# Patient Record
Sex: FEMALE | Race: WHITE | NOT HISPANIC OR LATINO | Employment: OTHER | URBAN - METROPOLITAN AREA
[De-identification: names, ages, dates, MRNs, and addresses within clinical notes are randomized per-mention and may not be internally consistent; named-entity substitution may affect disease eponyms.]

---

## 2017-02-15 ENCOUNTER — OFFICE VISIT (OUTPATIENT)
Dept: FAMILY MEDICINE CLINIC | Facility: CLINIC | Age: 67
End: 2017-02-15

## 2017-02-15 VITALS
SYSTOLIC BLOOD PRESSURE: 108 MMHG | WEIGHT: 181 LBS | HEIGHT: 64 IN | RESPIRATION RATE: 16 BRPM | TEMPERATURE: 98.1 F | BODY MASS INDEX: 30.9 KG/M2 | HEART RATE: 96 BPM | DIASTOLIC BLOOD PRESSURE: 72 MMHG | OXYGEN SATURATION: 98 %

## 2017-02-15 DIAGNOSIS — E78.1 HYPERTRIGLYCERIDEMIA: ICD-10-CM

## 2017-02-15 DIAGNOSIS — L40.9 PSORIASIS: Primary | ICD-10-CM

## 2017-02-15 PROCEDURE — 99213 OFFICE O/P EST LOW 20 MIN: CPT | Performed by: NURSE PRACTITIONER

## 2017-02-15 NOTE — PROGRESS NOTES
Subjective   Arcelia Cole is a 66 y.o. female.     History of Present Illness   Arcelia Cole 66 y.o. female who presents today for routine follow up check and medication refills.  She was a previous patient of Dr. Arvizu but is in need of new PCP as he has left the practice.  she has a history of   Patient Active Problem List   Diagnosis   • Psoriasis   • Hypertriglyceridemia   .  Since the last visit, she has overall felt well.  she has been compliant with current meds.  she denies medication side effects. She reports feeling well since last visit with no issues.  Patient states she uses ultravate cream as needed for psoriasis and that it has worked well for her for years. She has had the same tube for a few years.  She reports history of elevated triglycerides but has not had lab work checked in some time.     The following portions of the patient's history were reviewed and updated as appropriate: allergies, current medications, past family history, past medical history, past social history, past surgical history and problem list.    Review of Systems   Constitutional: Negative for unexpected weight change.   Respiratory: Negative for shortness of breath.    Cardiovascular: Negative for chest pain and palpitations.   Psychiatric/Behavioral: Negative for behavioral problems.       Objective   Physical Exam   Constitutional: She is oriented to person, place, and time. She appears well-developed and well-nourished.   Neck: Carotid bruit is not present. No thyromegaly present.   Cardiovascular: Normal rate and regular rhythm.    Pulmonary/Chest: Effort normal and breath sounds normal.   Neurological: She is alert and oriented to person, place, and time.   Psychiatric: She has a normal mood and affect. Judgment normal.   Vitals reviewed.      Assessment/Plan   Arcelia was seen today for psoriasis.    Diagnoses and all orders for this visit:    Psoriasis  -     halobetasol (ULTRAVATE) 0.05 % cream; Apply   topically 2 (Two) Times a Day.    Hypertriglyceridemia  -     Comprehensive metabolic panel  -     Lipid panel  -     CBC and Differential          Patient given order for labs. She will f/u in one year or sooner if abnormal labs.

## 2019-11-06 ENCOUNTER — OFFICE VISIT (OUTPATIENT)
Dept: FAMILY MEDICINE CLINIC | Facility: CLINIC | Age: 69
End: 2019-11-06

## 2019-11-06 VITALS
BODY MASS INDEX: 31.41 KG/M2 | SYSTOLIC BLOOD PRESSURE: 110 MMHG | WEIGHT: 184 LBS | HEART RATE: 104 BPM | HEIGHT: 64 IN | RESPIRATION RATE: 16 BRPM | OXYGEN SATURATION: 96 % | DIASTOLIC BLOOD PRESSURE: 72 MMHG | TEMPERATURE: 98.4 F

## 2019-11-06 DIAGNOSIS — Z12.11 SCREEN FOR COLON CANCER: ICD-10-CM

## 2019-11-06 DIAGNOSIS — Z78.0 POST-MENOPAUSAL: Primary | ICD-10-CM

## 2019-11-06 DIAGNOSIS — M21.611 BUNION OF RIGHT FOOT: ICD-10-CM

## 2019-11-06 DIAGNOSIS — E78.1 HYPERTRIGLYCERIDEMIA: ICD-10-CM

## 2019-11-06 DIAGNOSIS — Z12.31 ENCOUNTER FOR SCREENING MAMMOGRAM FOR BREAST CANCER: ICD-10-CM

## 2019-11-06 DIAGNOSIS — L40.9 PSORIASIS: ICD-10-CM

## 2019-11-06 DIAGNOSIS — Z86.32 HISTORY OF GESTATIONAL DIABETES: ICD-10-CM

## 2019-11-06 DIAGNOSIS — R10.2 PELVIC PAIN: ICD-10-CM

## 2019-11-06 DIAGNOSIS — E55.9 VITAMIN D DEFICIENCY: ICD-10-CM

## 2019-11-06 DIAGNOSIS — Z23 ENCOUNTER FOR IMMUNIZATION: ICD-10-CM

## 2019-11-06 PROCEDURE — G0009 ADMIN PNEUMOCOCCAL VACCINE: HCPCS | Performed by: PHYSICIAN ASSISTANT

## 2019-11-06 PROCEDURE — G0008 ADMIN INFLUENZA VIRUS VAC: HCPCS | Performed by: PHYSICIAN ASSISTANT

## 2019-11-06 PROCEDURE — 99214 OFFICE O/P EST MOD 30 MIN: CPT | Performed by: PHYSICIAN ASSISTANT

## 2019-11-06 PROCEDURE — 90653 IIV ADJUVANT VACCINE IM: CPT | Performed by: PHYSICIAN ASSISTANT

## 2019-11-06 PROCEDURE — 90732 PPSV23 VACC 2 YRS+ SUBQ/IM: CPT | Performed by: PHYSICIAN ASSISTANT

## 2019-11-06 NOTE — PROGRESS NOTES
"Subjective   Arcelia Cole is a 68 y.o. female.     History of Present Illness    Since the last visit, she has overall felt well.  She has hypertriglycemia and get labs.  she has been compliant with current medications have reviewed them.  The patient denies medication side effects.  Will refill medications. /72 (BP Location: Left arm, Patient Position: Sitting, Cuff Size: Adult)   Pulse 104   Temp 98.4 °F (36.9 °C) (Oral)   Resp 16   Ht 162.6 cm (64\")   Wt 83.5 kg (184 lb)   SpO2 96%   BMI 31.58 kg/m²   Right hallux deformity; hurts---has balance problem from it; refer ortho  No results found for this or any previous visit.  Want her to see GYN----rare pain--like in cervix  Needs colonoscopy  Needs mammo and DEXA  Need labs  Exercise daily  Will refill topical crm  The following portions of the patient's history were reviewed and updated as appropriate: allergies, current medications, past family history, past medical history, past social history, past surgical history and problem list.    Review of Systems   Constitutional: Negative for activity change, appetite change and unexpected weight change.   HENT: Negative for nosebleeds and trouble swallowing.    Eyes: Negative for pain and visual disturbance.   Respiratory: Negative for chest tightness, shortness of breath and wheezing.    Cardiovascular: Negative for chest pain and palpitations.   Gastrointestinal: Negative for abdominal pain and blood in stool.   Endocrine: Negative.    Genitourinary: Positive for enuresis and urgency. Negative for difficulty urinating and hematuria.   Musculoskeletal: Positive for arthralgias and myalgias. Negative for joint swelling.   Skin: Positive for rash. Negative for color change.   Allergic/Immunologic: Negative.    Neurological: Negative for syncope and speech difficulty.   Hematological: Negative for adenopathy.   Psychiatric/Behavioral: Negative for agitation and confusion.   All other systems reviewed and " are negative.      Objective   Physical Exam   Constitutional: She is oriented to person, place, and time. She appears well-developed and well-nourished. No distress.   HENT:   Head: Normocephalic and atraumatic.   Eyes: Conjunctivae and EOM are normal. Pupils are equal, round, and reactive to light. Right eye exhibits no discharge. Left eye exhibits no discharge. No scleral icterus.   Neck: Normal range of motion. Neck supple. No tracheal deviation present. No thyromegaly present.   Cardiovascular: Normal rate, regular rhythm, normal heart sounds, intact distal pulses and normal pulses. Exam reveals no gallop.   No murmur heard.  Pulmonary/Chest: Effort normal and breath sounds normal. No respiratory distress. She has no wheezes. She has no rales.   Musculoskeletal: Normal range of motion.   Neurological: She is alert and oriented to person, place, and time. She exhibits normal muscle tone. Coordination normal.   Skin: Skin is warm. Rash noted. No erythema. No pallor.   Psychiatric: She has a normal mood and affect. Her behavior is normal. Judgment and thought content normal.   Nursing note and vitals reviewed.      Assessment/Plan   Arcelia was seen today for shoulder pain.    Diagnoses and all orders for this visit:    Post-menopausal  -     DEXA Bone Density Axial; Future  -     Mammo Screening Digital Tomosynthesis Bilateral With CAD; Future  -     Comprehensive metabolic panel  -     Lipid panel  -     CBC and Differential  -     TSH  -     T3, Free  -     T4, Free  -     Vitamin B12  -     Folate  -     Vitamin D 25 Hydroxy  -     Hemoglobin A1c  -     Ambulatory Referral to Obstetrics / Gynecology    Encounter for screening mammogram for breast cancer  -     DEXA Bone Density Axial; Future  -     Mammo Screening Digital Tomosynthesis Bilateral With CAD; Future  -     Comprehensive metabolic panel  -     Lipid panel  -     CBC and Differential  -     TSH  -     T3, Free  -     T4, Free  -     Vitamin B12  -      Folate  -     Vitamin D 25 Hydroxy  -     Hemoglobin A1c  -     Ambulatory Referral to Obstetrics / Gynecology    Hypertriglyceridemia  -     Comprehensive metabolic panel  -     Lipid panel  -     CBC and Differential  -     TSH  -     T3, Free  -     T4, Free  -     Vitamin B12  -     Folate  -     Vitamin D 25 Hydroxy  -     Hemoglobin A1c  -     Ambulatory Referral to Obstetrics / Gynecology    Psoriasis  -     halobetasol (ULTRAVATE) 0.05 % cream; Apply  topically to the appropriate area as directed 2 (Two) Times a Day. PRN and never on face  -     Comprehensive metabolic panel  -     Lipid panel  -     CBC and Differential  -     TSH  -     T3, Free  -     T4, Free  -     Vitamin B12  -     Folate  -     Vitamin D 25 Hydroxy  -     Hemoglobin A1c  -     Ambulatory Referral to Obstetrics / Gynecology    Pelvic pain  -     Comprehensive metabolic panel  -     Lipid panel  -     CBC and Differential  -     TSH  -     T3, Free  -     T4, Free  -     Vitamin B12  -     Folate  -     Vitamin D 25 Hydroxy  -     Hemoglobin A1c  -     Ambulatory Referral to Obstetrics / Gynecology    History of gestational diabetes  -     Comprehensive metabolic panel  -     Lipid panel  -     CBC and Differential  -     TSH  -     T3, Free  -     T4, Free  -     Vitamin B12  -     Folate  -     Vitamin D 25 Hydroxy  -     Hemoglobin A1c  -     Ambulatory Referral to Obstetrics / Gynecology    Vitamin D deficiency  -     Comprehensive metabolic panel  -     Lipid panel  -     CBC and Differential  -     TSH  -     T3, Free  -     T4, Free  -     Vitamin B12  -     Folate  -     Vitamin D 25 Hydroxy  -     Hemoglobin A1c  -     Ambulatory Referral to Obstetrics / Gynecology    Other orders  -     Pneumococcal Conjugate Vaccine 13-Valent All  -     Fluad Tri 65yr+      Plan, Arcelia Cole, was seen today.  she was seen for Hyperlipidemia and will work on this with diet and exercise and hx gest DM.  See GYN for pain  Colonoscopy  screen  Refill topical crm for psoriasis   Labs  See podiatry  vaccines

## 2019-11-07 ENCOUNTER — TELEPHONE (OUTPATIENT)
Dept: OBSTETRICS AND GYNECOLOGY | Age: 69
End: 2019-11-07

## 2019-11-07 NOTE — TELEPHONE ENCOUNTER
LMTC 11/7/19 to sched NGYN pt appt w Dr Bess. Plz offer Mon 1/6/20 at 2 w Dr Bess.     Referral received for New gyn pt needing to est care for an annual exam and h/o pelvic discomfort.     Referred by:  Ana Pantoja    Requesting Dr Bess

## 2019-11-08 LAB
25(OH)D3+25(OH)D2 SERPL-MCNC: 53.6 NG/ML (ref 30–100)
ALBUMIN SERPL-MCNC: 4.5 G/DL (ref 3.5–5.2)
ALBUMIN/GLOB SERPL: 1.8 G/DL
ALP SERPL-CCNC: 101 U/L (ref 39–117)
ALT SERPL-CCNC: 22 U/L (ref 1–33)
AST SERPL-CCNC: 18 U/L (ref 1–32)
BASOPHILS # BLD AUTO: 0.07 10*3/MM3 (ref 0–0.2)
BASOPHILS NFR BLD AUTO: 0.9 % (ref 0–1.5)
BILIRUB SERPL-MCNC: 0.4 MG/DL (ref 0.2–1.2)
BUN SERPL-MCNC: 11 MG/DL (ref 8–23)
BUN/CREAT SERPL: 12.6 (ref 7–25)
CALCIUM SERPL-MCNC: 9.4 MG/DL (ref 8.6–10.5)
CHLORIDE SERPL-SCNC: 100 MMOL/L (ref 98–107)
CHOLEST SERPL-MCNC: 176 MG/DL (ref 0–200)
CO2 SERPL-SCNC: 18.7 MMOL/L (ref 22–29)
CREAT SERPL-MCNC: 0.87 MG/DL (ref 0.57–1)
EOSINOPHIL # BLD AUTO: 0.25 10*3/MM3 (ref 0–0.4)
EOSINOPHIL NFR BLD AUTO: 3.3 % (ref 0.3–6.2)
ERYTHROCYTE [DISTWIDTH] IN BLOOD BY AUTOMATED COUNT: 13 % (ref 12.3–15.4)
FOLATE SERPL-MCNC: >20 NG/ML (ref 4.78–24.2)
GLOBULIN SER CALC-MCNC: 2.5 GM/DL
GLUCOSE SERPL-MCNC: 82 MG/DL (ref 65–99)
HBA1C MFR BLD: 6.4 % (ref 4.8–5.6)
HCT VFR BLD AUTO: 42.1 % (ref 34–46.6)
HDLC SERPL-MCNC: 58 MG/DL (ref 40–60)
HGB BLD-MCNC: 13.8 G/DL (ref 12–15.9)
IMM GRANULOCYTES # BLD AUTO: 0.04 10*3/MM3 (ref 0–0.05)
IMM GRANULOCYTES NFR BLD AUTO: 0.5 % (ref 0–0.5)
LDLC SERPL CALC-MCNC: 85 MG/DL (ref 0–100)
LYMPHOCYTES # BLD AUTO: 1.88 10*3/MM3 (ref 0.7–3.1)
LYMPHOCYTES NFR BLD AUTO: 24.5 % (ref 19.6–45.3)
MCH RBC QN AUTO: 28 PG (ref 26.6–33)
MCHC RBC AUTO-ENTMCNC: 32.8 G/DL (ref 31.5–35.7)
MCV RBC AUTO: 85.6 FL (ref 79–97)
MONOCYTES # BLD AUTO: 0.45 10*3/MM3 (ref 0.1–0.9)
MONOCYTES NFR BLD AUTO: 5.9 % (ref 5–12)
NEUTROPHILS # BLD AUTO: 4.97 10*3/MM3 (ref 1.7–7)
NEUTROPHILS NFR BLD AUTO: 64.9 % (ref 42.7–76)
NRBC BLD AUTO-RTO: 0 /100 WBC (ref 0–0.2)
PLATELET # BLD AUTO: 350 10*3/MM3 (ref 140–450)
POTASSIUM SERPL-SCNC: 4.5 MMOL/L (ref 3.5–5.2)
PROT SERPL-MCNC: 7 G/DL (ref 6–8.5)
RBC # BLD AUTO: 4.92 10*6/MM3 (ref 3.77–5.28)
SODIUM SERPL-SCNC: 142 MMOL/L (ref 136–145)
T3FREE SERPL-MCNC: 3.5 PG/ML (ref 2–4.4)
T4 FREE SERPL-MCNC: 0.85 NG/DL (ref 0.93–1.7)
TRIGL SERPL-MCNC: 164 MG/DL (ref 0–150)
TSH SERPL DL<=0.005 MIU/L-ACNC: 5.99 UIU/ML (ref 0.27–4.2)
VIT B12 SERPL-MCNC: 920 PG/ML (ref 211–946)
VLDLC SERPL CALC-MCNC: 32.8 MG/DL
WBC # BLD AUTO: 7.66 10*3/MM3 (ref 3.4–10.8)

## 2019-12-05 ENCOUNTER — OFFICE VISIT (OUTPATIENT)
Dept: FAMILY MEDICINE CLINIC | Facility: CLINIC | Age: 69
End: 2019-12-05

## 2019-12-05 VITALS
HEART RATE: 95 BPM | BODY MASS INDEX: 31.41 KG/M2 | SYSTOLIC BLOOD PRESSURE: 120 MMHG | HEIGHT: 64 IN | OXYGEN SATURATION: 96 % | TEMPERATURE: 98.6 F | DIASTOLIC BLOOD PRESSURE: 80 MMHG | WEIGHT: 184 LBS | RESPIRATION RATE: 16 BRPM

## 2019-12-05 DIAGNOSIS — E78.2 HYPERLIPIDEMIA, MIXED: ICD-10-CM

## 2019-12-05 DIAGNOSIS — E55.9 VITAMIN D DEFICIENCY: ICD-10-CM

## 2019-12-05 DIAGNOSIS — R73.01 IMPAIRED FASTING GLUCOSE: Primary | ICD-10-CM

## 2019-12-05 DIAGNOSIS — E03.9 HYPOTHYROIDISM, ACQUIRED: ICD-10-CM

## 2019-12-05 PROCEDURE — G0439 PPPS, SUBSEQ VISIT: HCPCS | Performed by: PHYSICIAN ASSISTANT

## 2019-12-05 PROCEDURE — 99214 OFFICE O/P EST MOD 30 MIN: CPT | Performed by: PHYSICIAN ASSISTANT

## 2019-12-05 NOTE — PROGRESS NOTES
The ABCs of the Annual Wellness Visit  Subsequent Medicare Wellness Visit    Chief Complaint   Patient presents with   • Abnormal Lab       Subjective   History of Present Illness:  Arcelia Cole is a 69 y.o. female who presents for a Subsequent Medicare Wellness Visit.    HEALTH RISK ASSESSMENT    Recent Hospitalizations:  No hospitalization(s) within the last year.    Current Medical Providers:  Patient Care Team:  Ana Pantoja PA-C as PCP - General (Family Medicine)    Smoking Status:  Social History     Tobacco Use   Smoking Status Never Smoker   Smokeless Tobacco Never Used       Alcohol Consumption:  Social History     Substance and Sexual Activity   Alcohol Use Yes    Comment: occasional       Depression Screen:   PHQ-2/PHQ-9 Depression Screening 12/5/2019   Little interest or pleasure in doing things 0   Feeling down, depressed, or hopeless 0   Total Score 0       Fall Risk Screen:  STEADI Fall Risk Assessment was completed, and patient is at LOW risk for falls.Assessment completed on:12/5/2019    Health Habits and Functional and Cognitive Screening:  Functional & Cognitive Status 12/5/2019   Do you have difficulty preparing food and eating? No   Do you have difficulty bathing yourself, getting dressed or grooming yourself? No   Do you have difficulty using the toilet? No   Do you have difficulty moving around from place to place? No   Do you have trouble with steps or getting out of a bed or a chair? No   Current Diet Well Balanced Diet   Dental Exam Up to date   Eye Exam Up to date   Exercise (times per week) 7 times per week   Current Exercise Activities Include Aerobics   Do you need help using the phone?  No   Are you deaf or do you have serious difficulty hearing?  No   Do you need help with transportation? No   Do you need help shopping? No   Do you need help preparing meals?  No   Do you need help with housework?  No   Do you need help with laundry? No   Do you need help taking your medications?  No   Do you need help managing money? No   Do you ever drive or ride in a car without wearing a seat belt? No   Have you felt unusual stress, anger or loneliness in the last month? No   Who do you live with? Spouse   If you need help, do you have trouble finding someone available to you? No   Have you been bothered in the last four weeks by sexual problems? No   Do you have difficulty concentrating, remembering or making decisions? No         Does the patient have evidence of cognitive impairment? No    Asprin use counseling:Does not need ASA (and currently is not on it)    Age-appropriate Screening Schedule:  Refer to the list below for future screening recommendations based on patient's age, sex and/or medical conditions. Orders for these recommended tests are listed in the plan section. The patient has been provided with a written plan.    Health Maintenance   Topic Date Due   • MAMMOGRAM  02/15/2017   • ZOSTER VACCINE (1 of 2) 12/05/2019 (Originally 12/2/2001)   • LIPID PANEL  11/07/2020   • COLONOSCOPY  02/15/2027   • TDAP/TD VACCINES (2 - Td) 02/15/2027   • INFLUENZA VACCINE  Completed   • PNEUMOCOCCAL VACCINES (65+ LOW/MEDIUM RISK)  Completed          The following portions of the patient's history were reviewed and updated as appropriate: allergies, current medications, past family history, past medical history, past social history, past surgical history and problem list.    Outpatient Medications Prior to Visit   Medication Sig Dispense Refill   • halobetasol (ULTRAVATE) 0.05 % cream Apply  topically to the appropriate area as directed 2 (Two) Times a Day. PRN and never on face 50 g 11     No facility-administered medications prior to visit.        Patient Active Problem List   Diagnosis   • Psoriasis   • Hypertriglyceridemia       Advanced Care Planning:  Patient does not have an advance directive - additional information requested. Referral to advance care planning placed    Review of Systems    Compared  "to one year ago, the patient feels her physical health is the same.  Compared to one year ago, the patient feels her mental health is the same.    Reviewed chart for potential of high risk medication in the elderly: yes  Reviewed chart for potential of harmful drug interactions in the elderly:yes    Objective         Vitals:    12/05/19 1350   BP: 120/80   BP Location: Left arm   Patient Position: Sitting   Cuff Size: Large Adult   Pulse: 95   Resp: 16   Temp: 98.6 °F (37 °C)   TempSrc: Oral   SpO2: 96%   Weight: 83.5 kg (184 lb)   Height: 162.6 cm (64\")   PainSc: 0-No pain       Body mass index is 31.58 kg/m².  Discussed the patient's BMI with her. The BMI is above average; BMI management plan is completed.    Physical Exam    Lab Results   Component Value Date    GLU 82 11/07/2019    CHLPL 176 11/07/2019    TRIG 164 (H) 11/07/2019    HDL 58 11/07/2019    LDL 85 11/07/2019    VLDL 32.8 11/07/2019    HGBA1C 6.40 (H) 11/07/2019        Assessment/Plan   Medicare Risks and Personalized Health Plan  CMS Preventative Services Quick Reference  ordered this last visit; has mammo set; colonoscopy set    The above risks/problems have been discussed with the patient.  Pertinent information has been shared with the patient in the After Visit Summary.  Follow up plans and orders are seen below in the Assessment/Plan Section.    There are no diagnoses linked to this encounter.  Follow Up:  No Follow-up on file.     An After Visit Summary and PPPS were given to the patient.             "

## 2019-12-05 NOTE — PATIENT INSTRUCTIONS
Exercising to Lose Weight  Exercise is structured, repetitive physical activity to improve fitness and health. Getting regular exercise is important for everyone. It is especially important if you are overweight. Being overweight increases your risk of heart disease, stroke, diabetes, high blood pressure, and several types of cancer. Reducing your calorie intake and exercising can help you lose weight.  Exercise is usually categorized as moderate or vigorous intensity. To lose weight, most people need to do a certain amount of moderate-intensity or vigorous-intensity exercise each week.  Moderate-intensity exercise    Moderate-intensity exercise is any activity that gets you moving enough to burn at least three times more energy (calories) than if you were sitting.  Examples of moderate exercise include:  · Walking a mile in 15 minutes.  · Doing light yard work.  · Biking at an easy pace.  Most people should get at least 150 minutes (2 hours and 30 minutes) a week of moderate-intensity exercise to maintain their body weight.  Vigorous-intensity exercise  Vigorous-intensity exercise is any activity that gets you moving enough to burn at least six times more calories than if you were sitting. When you exercise at this intensity, you should be working hard enough that you are not able to carry on a conversation.  Examples of vigorous exercise include:  · Running.  · Playing a team sport, such as football, basketball, and soccer.  · Jumping rope.  Most people should get at least 75 minutes (1 hour and 15 minutes) a week of vigorous-intensity exercise to maintain their body weight.  How can exercise affect me?  When you exercise enough to burn more calories than you eat, you lose weight. Exercise also reduces body fat and builds muscle. The more muscle you have, the more calories you burn. Exercise also:  · Improves mood.  · Reduces stress and tension.  · Improves your overall fitness, flexibility, and  endurance.  · Increases bone strength.  The amount of exercise you need to lose weight depends on:  · Your age.  · The type of exercise.  · Any health conditions you have.  · Your overall physical ability.  Talk to your health care provider about how much exercise you need and what types of activities are safe for you.  What actions can I take to lose weight?  Nutrition    · Make changes to your diet as told by your health care provider or diet and nutrition specialist (dietitian). This may include:  ? Eating fewer calories.  ? Eating more protein.  ? Eating less unhealthy fats.  ? Eating a diet that includes fresh fruits and vegetables, whole grains, low-fat dairy products, and lean protein.  ? Avoiding foods with added fat, salt, and sugar.  · Drink plenty of water while you exercise to prevent dehydration or heat stroke.  Activity  · Choose an activity that you enjoy and set realistic goals. Your health care provider can help you make an exercise plan that works for you.  · Exercise at a moderate or vigorous intensity most days of the week.  ? The intensity of exercise may vary from person to person. You can tell how intense a workout is for you by paying attention to your breathing and heartbeat. Most people will notice their breathing and heartbeat get faster with more intense exercise.  · Do resistance training twice each week, such as:  ? Push-ups.  ? Sit-ups.  ? Lifting weights.  ? Using resistance bands.  · Getting short amounts of exercise can be just as helpful as long structured periods of exercise. If you have trouble finding time to exercise, try to include exercise in your daily routine.  ? Get up, stretch, and walk around every 30 minutes throughout the day.  ? Go for a walk during your lunch break.  ? Park your car farther away from your destination.  ? If you take public transportation, get off one stop early and walk the rest of the way.  ? Make phone calls while standing up and walking  around.  ? Take the stairs instead of elevators or escalators.  · Wear comfortable clothes and shoes with good support.  · Do not exercise so much that you hurt yourself, feel dizzy, or get very short of breath.  Where to find more information  · [place] Department of Health and Human Services: www.hhs.gov  · Centers for Disease Control and Prevention (CDC): www.cdc.gov  Contact a health care provider:  · Before starting a new exercise program.  · If you have questions or concerns about your weight.  · If you have a medical problem that keeps you from exercising.  Get help right away if you have any of the following while exercising:  · Injury.  · Dizziness.  · Difficulty breathing or shortness of breath that does not go away when you stop exercising.  · Chest pain.  · Rapid heartbeat.  Summary  · Being overweight increases your risk of heart disease, stroke, diabetes, high blood pressure, and several types of cancer.  · Losing weight happens when you burn more calories than you eat.  · Reducing the amount of calories you eat in addition to getting regular moderate or vigorous exercise each week helps you lose weight.  This information is not intended to replace advice given to you by your health care provider. Make sure you discuss any questions you have with your health care provider.  Document Released: 01/20/2012 Document Revised: 12/31/2018 Document Reviewed: 12/31/2018  [employer] Patient Education © 2019 [employer]      Hypothyroidism    Hypothyroidism is when the thyroid gland does not make enough of certain hormones (it is underactive). The thyroid gland is a small gland located in the lower front part of the neck, just in front of the windpipe (trachea). This gland makes hormones that help control how the body uses food for energy (metabolism) as well as how the heart and brain function. These hormones also play a role in keeping your bones strong. When the thyroid is underactive, it produces too  little of the hormones thyroxine (T4) and triiodothyronine (T3).  What are the causes?  This condition may be caused by:  · Hashimoto's disease. This is a disease in which the body's disease-fighting system (immune system) attacks the thyroid gland. This is the most common cause.  · Viral infections.  · Pregnancy.  · Certain medicines.  · Birth defects.  · Past radiation treatments to the head or neck for cancer.  · Past treatment with radioactive iodine.  · Past exposure to radiation in the environment.  · Past surgical removal of part or all of the thyroid.  · Problems with a gland in the center of the brain (pituitary gland).  · Lack of enough iodine in the diet.  What increases the risk?  You are more likely to develop this condition if:  · You are female.  · You have a family history of thyroid conditions.  · You use a medicine called lithium.  · You take medicines that affect the immune system (immunosuppressants).  What are the signs or symptoms?  Symptoms of this condition include:  · Feeling as though you have no energy (lethargy).  · Not being able to tolerate cold.  · Weight gain that is not explained by a change in diet or exercise habits.  · Lack of appetite.  · Dry skin.  · Coarse hair.  · Menstrual irregularity.  · Slowing of thought processes.  · Constipation.  · Sadness or depression.  How is this diagnosed?  This condition may be diagnosed based on:  · Your symptoms, your medical history, and a physical exam.  · Blood tests.  You may also have imaging tests, such as an ultrasound or MRI.  How is this treated?  This condition is treated with medicine that replaces the thyroid hormones that your body does not make. After you begin treatment, it may take several weeks for symptoms to go away.  Follow these instructions at home:  · Take over-the-counter and prescription medicines only as told by your health care provider.  · If you start taking any new medicines, tell your health care provider.  · Keep  all follow-up visits as told by your health care provider. This is important.  ? As your condition improves, your dosage of thyroid hormone medicine may change.  ? You will need to have blood tests regularly so that your health care provider can monitor your condition.  Contact a health care provider if:  · Your symptoms do not get better with treatment.  · You are taking thyroid replacement medicine and you:  ? Sweat a lot.  ? Have tremors.  ? Feel anxious.  ? Lose weight rapidly.  ? Cannot tolerate heat.  ? Have emotional swings.  ? Have diarrhea.  ? Feel weak.  Get help right away if you have:  · Chest pain.  · An irregular heartbeat.  · A rapid heartbeat.  · Difficulty breathing.  Summary  · Hypothyroidism is when the thyroid gland does not make enough of certain hormones (it is underactive).  · When the thyroid is underactive, it produces too little of the hormones thyroxine (T4) and triiodothyronine (T3).  · The most common cause is Hashimoto's disease, a disease in which the body's disease-fighting system (immune system) attacks the thyroid gland. The condition can also be caused by viral infections, medicine, pregnancy, or past radiation treatment to the head or neck.  · Symptoms may include weight gain, dry skin, constipation, feeling as though you do not have energy, and not being able to tolerate cold.  · This condition is treated with medicine to replace the thyroid hormones that your body does not make.  This information is not intended to replace advice given to you by your health care provider. Make sure you discuss any questions you have with your health care provider.  Document Released: 12/18/2006 Document Revised: 11/28/2018 Document Reviewed: 11/28/2018  [employer] Interactive Patient Education © 2019 [employer]

## 2019-12-05 NOTE — PROGRESS NOTES
"Subjective   Arcelia Cole is a 69 y.o. female.     History of Present Illness    Since the last visit, she has overall felt well.  She has Impaired fasting glucose and will monitor labs to watch for DMII, Hyperlipidemia and working on this with diet and exercise, Hypothyroidism and will start medication with plan for follow up labs and Vitamin D deficiency and labs are at goal >30 ng/mL.  she is here to go over labs; set up several preventive appt last visit.  2013 AHA (American Heart Association) Cholesterol Risk Ratio Score Goal is <=7.5% and your score is:    6.4%  The patient denies medication side effects.  Will refill medications. /80 (BP Location: Left arm, Patient Position: Sitting, Cuff Size: Large Adult)   Pulse 95   Temp 98.6 °F (37 °C) (Oral)   Resp 16   Ht 162.6 cm (64\")   Wt 83.5 kg (184 lb)   SpO2 96%   BMI 31.58 kg/m²   Parents DMII  Results for orders placed or performed in visit on 11/06/19   Comprehensive metabolic panel   Result Value Ref Range    Glucose 82 65 - 99 mg/dL    BUN 11 8 - 23 mg/dL    Creatinine 0.87 0.57 - 1.00 mg/dL    eGFR Non African Am 65 >60 mL/min/1.73    eGFR African Am 79 >60 mL/min/1.73    BUN/Creatinine Ratio 12.6 7.0 - 25.0    Sodium 142 136 - 145 mmol/L    Potassium 4.5 3.5 - 5.2 mmol/L    Chloride 100 98 - 107 mmol/L    Total CO2 18.7 (L) 22.0 - 29.0 mmol/L    Calcium 9.4 8.6 - 10.5 mg/dL    Total Protein 7.0 6.0 - 8.5 g/dL    Albumin 4.50 3.50 - 5.20 g/dL    Globulin 2.5 gm/dL    A/G Ratio 1.8 g/dL    Total Bilirubin 0.4 0.2 - 1.2 mg/dL    Alkaline Phosphatase 101 39 - 117 U/L    AST (SGOT) 18 1 - 32 U/L    ALT (SGPT) 22 1 - 33 U/L   Lipid panel   Result Value Ref Range    Total Cholesterol 176 0 - 200 mg/dL    Triglycerides 164 (H) 0 - 150 mg/dL    HDL Cholesterol 58 40 - 60 mg/dL    VLDL Cholesterol 32.8 mg/dL    LDL Cholesterol  85 0 - 100 mg/dL   TSH   Result Value Ref Range    TSH 5.990 (H) 0.270 - 4.200 uIU/mL   T3, Free   Result Value Ref Range "    T3, Free 3.5 2.0 - 4.4 pg/mL   T4, Free   Result Value Ref Range    Free T4 0.85 (L) 0.93 - 1.70 ng/dL   Vitamin B12   Result Value Ref Range    Vitamin B-12 920 211 - 946 pg/mL   Folate   Result Value Ref Range    Folate >20.00 4.78 - 24.20 ng/mL   Vitamin D 25 Hydroxy   Result Value Ref Range    25 Hydroxy, Vitamin D 53.6 30.0 - 100.0 ng/ml   Hemoglobin A1c   Result Value Ref Range    Hemoglobin A1C 6.40 (H) 4.80 - 5.60 %   CBC and Differential   Result Value Ref Range    WBC 7.66 3.40 - 10.80 10*3/mm3    RBC 4.92 3.77 - 5.28 10*6/mm3    Hemoglobin 13.8 12.0 - 15.9 g/dL    Hematocrit 42.1 34.0 - 46.6 %    MCV 85.6 79.0 - 97.0 fL    MCH 28.0 26.6 - 33.0 pg    MCHC 32.8 31.5 - 35.7 g/dL    RDW 13.0 12.3 - 15.4 %    Platelets 350 140 - 450 10*3/mm3    Neutrophil Rel % 64.9 42.7 - 76.0 %    Lymphocyte Rel % 24.5 19.6 - 45.3 %    Monocyte Rel % 5.9 5.0 - 12.0 %    Eosinophil Rel % 3.3 0.3 - 6.2 %    Basophil Rel % 0.9 0.0 - 1.5 %    Neutrophils Absolute 4.97 1.70 - 7.00 10*3/mm3    Lymphocytes Absolute 1.88 0.70 - 3.10 10*3/mm3    Monocytes Absolute 0.45 0.10 - 0.90 10*3/mm3    Eosinophils Absolute 0.25 0.00 - 0.40 10*3/mm3    Basophils Absolute 0.07 0.00 - 0.20 10*3/mm3    Immature Granulocyte Rel % 0.5 0.0 - 0.5 %    Immature Grans Absolute 0.04 0.00 - 0.05 10*3/mm3    nRBC 0.0 0.0 - 0.2 /100 WBC         The following portions of the patient's history were reviewed and updated as appropriate: allergies, current medications, past family history, past medical history, past social history, past surgical history and problem list.    Review of Systems   Constitutional: Negative for activity change, appetite change and unexpected weight change.   HENT: Positive for congestion. Negative for nosebleeds and trouble swallowing.    Eyes: Negative for pain and visual disturbance.   Respiratory: Negative for chest tightness, shortness of breath and wheezing.    Cardiovascular: Negative for chest pain and palpitations.    Gastrointestinal: Negative for abdominal pain and blood in stool.   Endocrine: Negative.    Genitourinary: Negative for difficulty urinating and hematuria.   Musculoskeletal: Negative for joint swelling.   Skin: Negative for color change and rash.   Allergic/Immunologic: Negative.    Neurological: Negative for syncope and speech difficulty.   Hematological: Negative for adenopathy.   Psychiatric/Behavioral: Negative for agitation and confusion.   All other systems reviewed and are negative.      Objective   Physical Exam   Constitutional: She is oriented to person, place, and time. She appears well-developed and well-nourished. No distress.   HENT:   Head: Normocephalic and atraumatic.   Eyes: Conjunctivae and EOM are normal. Pupils are equal, round, and reactive to light. Right eye exhibits no discharge. Left eye exhibits no discharge. No scleral icterus.   Neck: Normal range of motion. Neck supple. No tracheal deviation present. No thyromegaly present.   Cardiovascular: Normal rate, regular rhythm, normal heart sounds, intact distal pulses and normal pulses. Exam reveals no gallop.   No murmur heard.  Pulmonary/Chest: Effort normal and breath sounds normal. No respiratory distress. She has no wheezes. She has no rales.   Musculoskeletal: Normal range of motion.   Neurological: She is alert and oriented to person, place, and time. She exhibits normal muscle tone. Coordination normal.   Skin: Skin is warm. No rash noted. No erythema. No pallor.   Psychiatric: She has a normal mood and affect. Her behavior is normal. Judgment and thought content normal.   Nursing note and vitals reviewed.      Assessment/Plan   Arcelia was seen today for abnormal lab.    Diagnoses and all orders for this visit:    Impaired fasting glucose  -     T3, Free; Future  -     T4, Free; Future  -     TSH; Future  -     Hemoglobin A1c; Future  -     Basic Metabolic Panel; Future    Vitamin D deficiency  -     T3, Free; Future  -     T4,  Free; Future  -     TSH; Future  -     Hemoglobin A1c; Future  -     Basic Metabolic Panel; Future    Hypothyroidism, acquired  -     T3, Free; Future  -     T4, Free; Future  -     TSH; Future  -     Hemoglobin A1c; Future  -     Basic Metabolic Panel; Future    Hyperlipidemia, mixed  -     T3, Free; Future  -     T4, Free; Future  -     TSH; Future  -     Hemoglobin A1c; Future  -     Basic Metabolic Panel; Future        Plan, Arcelia Cole, was seen today.  she was seen for Imparied fasting glucose and plan follow up labs, diet, and exercise, Hyperlipidemia and will work on this with diet and exercise, Hypothyroidism and will start medication for this with f/u labs and Vitamin D deficiency and supplemented.  Talked about diet and exercise; need to prevent DMII; suggest WW  Start more exercise  Labs in 3mos

## 2019-12-12 ENCOUNTER — PREP FOR SURGERY (OUTPATIENT)
Dept: OTHER | Facility: HOSPITAL | Age: 69
End: 2019-12-12

## 2019-12-12 DIAGNOSIS — Z12.11 SCREEN FOR COLON CANCER: Primary | ICD-10-CM

## 2019-12-13 ENCOUNTER — HOSPITAL ENCOUNTER (OUTPATIENT)
Dept: BONE DENSITY | Facility: HOSPITAL | Age: 69
Discharge: HOME | End: 2019-12-13

## 2019-12-13 ENCOUNTER — HOSPITAL ENCOUNTER (OUTPATIENT)
Dept: MAMMOGRAPHY | Facility: HOSPITAL | Age: 69
Discharge: HOME | End: 2019-12-13
Admitting: PHYSICIAN ASSISTANT

## 2019-12-13 DIAGNOSIS — Z12.31 ENCOUNTER FOR SCREENING MAMMOGRAM FOR BREAST CANCER: ICD-10-CM

## 2019-12-13 DIAGNOSIS — Z78.0 POST-MENOPAUSAL: ICD-10-CM

## 2019-12-13 PROCEDURE — 77067 SCR MAMMO BI INCL CAD: CPT

## 2019-12-13 PROCEDURE — 77063 BREAST TOMOSYNTHESIS BI: CPT

## 2019-12-13 PROCEDURE — 77080 DXA BONE DENSITY AXIAL: CPT

## 2019-12-19 ENCOUNTER — OFFICE VISIT (OUTPATIENT)
Dept: ORTHOPEDIC SURGERY | Facility: CLINIC | Age: 69
End: 2019-12-19

## 2019-12-19 VITALS — TEMPERATURE: 98.1 F | BODY MASS INDEX: 33.24 KG/M2 | WEIGHT: 187.6 LBS | HEIGHT: 63 IN

## 2019-12-19 DIAGNOSIS — M20.11 HALLUX VALGUS OF RIGHT FOOT: Primary | ICD-10-CM

## 2019-12-19 DIAGNOSIS — Q66.229 METATARSUS ADDUCTUS: ICD-10-CM

## 2019-12-19 DIAGNOSIS — M79.671 FOOT PAIN, RIGHT: ICD-10-CM

## 2019-12-19 PROCEDURE — 73630 X-RAY EXAM OF FOOT: CPT | Performed by: ORTHOPAEDIC SURGERY

## 2019-12-19 PROCEDURE — 99204 OFFICE O/P NEW MOD 45 MIN: CPT | Performed by: ORTHOPAEDIC SURGERY

## 2019-12-19 NOTE — PROGRESS NOTES
New Patient Complaint      Patient: Arcelia Cole  YOB: 1950 69 y.o. female  Medical Record Number: [id number]    Chief Complaints: My toes are spread     History of Present Illness:   Patient reports chronic bunion deformity and deformity of the lesser toes that has not been particularly painful  but she is noticed that they have spread a bit more.    She had chronic bunion deformities is not been particularly painful to her other than with tight shoe wear and feels like her that the posture of her foot has been affecting her balance to some degree.  It is not particular impacted her activities.    HPI    Allergies: No Known Allergies    Medications:   Current Outpatient Medications on File Prior to Visit   Medication Sig   • halobetasol (ULTRAVATE) 0.05 % cream Apply  topically to the appropriate area as directed 2 (Two) Times a Day. PRN and never on face   • Misc Natural Products (ESTROVEN ENERGY PO) Take  by mouth.   • Misc Natural Products (OSTEO BI-FLEX JOINT SHIELD PO) Take  by mouth.   • Multiple Vitamins-Minerals (CENTRUM WOMEN PO) Take  by mouth.   • Probiotic Product (PROBIOTIC DAILY PO) Take  by mouth.     No current facility-administered medications on file prior to visit.        Past Medical History:   Diagnosis Date   • Psoriasis      Past Surgical History:   Procedure Laterality Date   • MANDIBLE SURGERY     • WRIST SURGERY       Social History     Occupational History   • Not on file   Tobacco Use   • Smoking status: Never Smoker   • Smokeless tobacco: Never Used   Substance and Sexual Activity   • Alcohol use: Yes     Comment: occasional   • Drug use: Not on file   • Sexual activity: Not on file      Social History     Social History Narrative   • Not on file     Family History   Problem Relation Age of Onset   • Diabetes Mother    • Heart disease Mother    • Diabetes Father        Review of Systems: 14 point review of systems performed, positive pertinent findings identified  "in HPI. All remaining systems negative except ringing in the ears    Review of Systems      Physical Exam:   Vitals:    12/19/19 1601   Temp: 98.1 °F (36.7 °C)   TempSrc: Temporal   Weight: 85.1 kg (187 lb 9.6 oz)   Height: 160 cm (63\")     Physical Exam   Constitutional: pleasant, well developed   Eyes: sclera non icteric  Hearing : adequate for exam  Cardiovascular: palpable pulses in right foot, right calf/ thigh NT without sign of DVT  Respiratoy: breathing unlabored   Neurological: grossly sensate to LT throughout right LE  Psychiatric: oriented with normal mood and affect.   Lymphatic: No palpable popliteal lymphadenopathy right LE  Skin: intact throughout right leg/foot  Musculoskeletal: On exam she has somewhat of a metatarsus abductus posture to the foot with mild appearing hallux valgus deformity that is passively correctable to neutral with minimal if any discomfort range of motion of the first MTP joint and no focal tenderness over the medial aspect.  There is some varus alignment of the lesser toes but is not terribly dramatic but no appreciable cock-up deformity instability or irritability.  Physical Exam  Ortho Exam    Radiology: 3 views of the right foot ordered evaluate pain reviewed and no [place] for comparison there is moderate hallux valgus deformity with metatarsus abductus.    Assessment/Plan: 1.  Right hallux valgus with metatarsus adductus.    Reviewed with her this is somewhat of a difficult reconstructive problem and is not bothering her enough to consider surgical treatment at this time.  Discussed surgical treatment with her today and is not bothering her enough for this at this point.    She more concerned about her balance and weightbearing on this so we will get her into some physical therapy with Gerry to work on balance gait training and orthotics    If symptoms persist or worsen regarding her balance I recommended that she see her primary care physician for further work-up as I " do not think that her balance abnormalities are necessarily related to her bunion deformity.    If symptoms persist or worsen but the forefoot deformities with pain etc. that she desires surgical treatment should let me know otherwise by mutual agreement I will see her back as needed.  We had a very pleasant visit

## 2020-01-06 ENCOUNTER — TREATMENT (OUTPATIENT)
Dept: PHYSICAL THERAPY | Facility: CLINIC | Age: 70
End: 2020-01-06

## 2020-01-06 DIAGNOSIS — M20.12 HALLUX VALGUS (ACQUIRED), LEFT FOOT: ICD-10-CM

## 2020-01-06 DIAGNOSIS — R26.9 ABNORMAL GAIT: ICD-10-CM

## 2020-01-06 DIAGNOSIS — M20.11 HALLUX VALGUS OF RIGHT FOOT: ICD-10-CM

## 2020-01-06 DIAGNOSIS — M25.512 ACUTE PAIN OF LEFT SHOULDER: Primary | ICD-10-CM

## 2020-01-06 PROCEDURE — 97162 PT EVAL MOD COMPLEX 30 MIN: CPT | Performed by: PHYSICAL THERAPIST

## 2020-01-06 PROCEDURE — 97110 THERAPEUTIC EXERCISES: CPT | Performed by: PHYSICAL THERAPIST

## 2020-01-07 NOTE — PROGRESS NOTES
Physical Therapy Initial Evaluation and Plan of Care      Patient: Arcelia Cole   : 1950  Diagnosis/ICD-10 Code:  Acute pain of left shoulder [M25.512]  Referring practitioner: Rip Nicolas*  Date of Initial Visit: 2020  Today's Date: 2020  Patient seen for 1 sessions           Subjective Evaluation    History of Present Illness    Subjective comment: pt presents to PT reports (L) shoulder pain/ (R) hallux valgus;  reports difficulty with dressing/ grooming/ lifting activities/ pain with prolonged ambulation. Pain  Current pain ratin  At best pain ratin  At worst pain ratin  Quality: throbbing, tight and sharp  Aggravating factors: overhead activity, stairs, ambulation, outstretched reach and repetitive movement    Patient Goals  Patient goals for therapy: increased motion, improved balance, decreased pain, increased strength, independence with ADLs/IADLs, return to sport/leisure activities and return to work             Objective       Observations     Additional Observation Details  Pt is a 68 y/o female presenting to PT with s/s consistent with (L) shoulder pain/ (R) bunion; pt reports decreased tolerance with reaching lifting/ grooming activites (L) UE secondary to shoulder pain along with poor tolerance with prolonged ambulation secondary to progressive hallux valgus pain (L) LE.  Pt is appropriate for orthotic consult along with PT services specific to (L) shoulder pain.  Pt demonstrates decreased (L) shoulder AROM > 130 in flexion.  (+) painful arc/ (+) speeds; pain with scaption/ abduction 4-/5; limited IR AROM 65 degrees with pain.  (+) capsular pattern suggesting symptoms of arthrofibrosis.  Decreased (R) ankle AROM in DF 9 degrees.  Pt is a good candidate for skilled PT service to address these functional deficits.        See Exercise, Manual, and Modality Logs for complete treatment.           Assessment & Plan     Assessment  Assessment details: .Arcelia SERGIO  Douglas is a 69 y.o. year-old female referred to physical therapy for (L) shoulder pain/ orthotic consult/ abnormal gait ( hallux valugs pain). She presents with a stable clinical presentation.  She has no comorbidities or personal factors  that may affect her progress in the plan of care.  Pt demonstrate decreased tolerance with (L) UE AROM; poor tolerance with lifting with (L) UE;  10-15 min ambulation tolerance secondary to hallus valgus pain, mild limitation in (R) DF 8 degrees. Pt to Signs and symptoms are consistent with physical therapy diagnosis of (L) shoulder pain/ abnormal gait/ orthotic consult.  Prognosis: good    Goals  Plan Goals: stg    Pt to be assessed for orthotic consult. 2 wks.    Increase (L) shoulder PROM in flexion / abduction 165 to allow for increase ease with dressing/ grooming activities. 2 wks.    Increase (R) DF AROM 12 degrees to allow for improved biomechanics with gait sequence. 2 wks.    ltg    Increase (L) shoulder AROM 110 in flexion without apprehension to allow for increased ease with dressing activities. 4wks.     Increase (L) RC-scapular stabilization at 90 to 4/5 to allow for increased ease with modified reaching/ lifting tasks without symptoms of pain > 2/ 10 consistently. 4 wks.     Improve dash score by 15% 4 wks.     Plan  Planned therapy interventions: IADL retraining, joint mobilization, home exercise program, gait training, functional ROM exercises, flexibility, stretching, strengthening, therapeutic activities, transfer training, ADL retraining, neuromuscular re-education, manual therapy and motor coordination training  Other planned therapy interventions: orthotic consult (R) hallux valugs  Frequency: 2x week  Duration in visits: 2  Duration in weeks: 4  Treatment plan discussed with: patient        Timed:  Manual Therapy:         mins  67129;  Therapeutic Exercise:   15      mins  74859;     Neuromuscular Yue:        mins  84048;    Therapeutic Activity:           mins  70316;     Gait Training:           mins  89672;     Ultrasound:          mins  25477;    Electrical Stimulation:         mins  46693 ( );  Iontophoresis         mins 14266      Untimed:  Electrical Stimulation:         mins  25191 ( );  Mechanical Traction:         mins  22809;     Timed Treatment: 45     mins   Total Treatment:     45   mins    PT SIGNATURE: Michael Jesus, PT   DATE TREATMENT INITIATED: 1/7/2020    Initial Certification  Certification Period: 4/6/2020  I certify that the therapy services are furnished while this patient is under my care.  The services outlined above are required by this patient, and will be reviewed every 90 days.     PHYSICIAN: Rip Nicolas MD      DATE:     Please sign and return via fax to [phone].. Thank you, [facility].

## 2020-01-22 ENCOUNTER — TREATMENT (OUTPATIENT)
Dept: PHYSICAL THERAPY | Facility: CLINIC | Age: 70
End: 2020-01-22

## 2020-01-22 DIAGNOSIS — M25.512 ACUTE PAIN OF LEFT SHOULDER: Primary | ICD-10-CM

## 2020-01-22 DIAGNOSIS — R26.9 ABNORMAL GAIT: ICD-10-CM

## 2020-01-22 DIAGNOSIS — M20.12 HALLUX VALGUS (ACQUIRED), LEFT FOOT: ICD-10-CM

## 2020-01-22 PROCEDURE — 97110 THERAPEUTIC EXERCISES: CPT | Performed by: PHYSICAL THERAPIST

## 2020-01-22 PROCEDURE — 97140 MANUAL THERAPY 1/> REGIONS: CPT | Performed by: PHYSICAL THERAPIST

## 2020-01-22 NOTE — PROGRESS NOTES
Physical Therapy Daily Progress Note    Patient: Arcelia Cole   : 1950  Diagnosis/ICD-10 Code:  Acute pain of left shoulder [M25.512]  Referring practitioner: Rip Nicolas*  Date of Initial Visit: Type: THERAPY  Noted: 2020  Today's Date: 2020  Patient seen for 2 sessions           Subjective Evaluation    History of Present Illness    Subjective comment: getting looser and walked on the beach and got in the pool to ex so arm and feet do feel much better.  wants to do off the shelf orthotics or inserts vs. custom right now due to cost.        Objective   See Exercise, Manual, and Modality Logs for complete treatment.       Assessment & Plan     Assessment  Assessment details: Definite strain felt post sh at delt/infraspinatus. Hyper sensitive to touch. Also with significant ROM loss at end range in all planes not tolerant of wall and cane stretches or pulleys, but did well with wall wash.                Timed:    Manual Therapy:    15     mins  68941;  Therapeutic Exercise:    30     mins  43409;     Neuromuscular Yue:        mins  85824;    Therapeutic Activity:          mins  83099;     Gait Training:           mins  54865;     Ultrasound:          mins  14332;    Electrical Stimulation:         mins  38380 ( );  Iontophoresis         mins 47433;  Aquatic Therapy         mins 14472;    Untimed:  Electrical Stimulation:         mins  43488 ( );  Mechanical Traction:         mins  52365;     Timed Treatment:   45   mins   Total Treatment:     45   mins  Zorre Zeno Kimura, PT  Physical Therapist

## 2020-01-27 ENCOUNTER — TREATMENT (OUTPATIENT)
Dept: PHYSICAL THERAPY | Facility: CLINIC | Age: 70
End: 2020-01-27

## 2020-01-27 DIAGNOSIS — M25.512 ACUTE PAIN OF LEFT SHOULDER: Primary | ICD-10-CM

## 2020-01-27 DIAGNOSIS — M20.11 HALLUX VALGUS OF RIGHT FOOT: ICD-10-CM

## 2020-01-27 DIAGNOSIS — R26.9 ABNORMAL GAIT: ICD-10-CM

## 2020-01-27 DIAGNOSIS — M20.12 HALLUX VALGUS (ACQUIRED), LEFT FOOT: ICD-10-CM

## 2020-01-27 PROCEDURE — 97140 MANUAL THERAPY 1/> REGIONS: CPT | Performed by: PHYSICAL THERAPIST

## 2020-01-27 PROCEDURE — 97110 THERAPEUTIC EXERCISES: CPT | Performed by: PHYSICAL THERAPIST

## 2020-01-27 NOTE — PROGRESS NOTES
Physical Therapy Daily Progress Note    Patient: Arcelia Cole   : 1950  Diagnosis/ICD-10 Code:  Acute pain of left shoulder [M25.512]  Referring practitioner: Rip Nicolas*  Date of Initial Visit: Type: THERAPY  Noted: 2020  Today's Date: 2020  Patient seen for 3 sessions           Subjective Evaluation    History of Present Illness    Subjective comment: little sore after last rx but had a massage and focused rx to posterior shoulder. good definition of post delt with TP on the left. pt relates hx of swimming a lot thus more defined in this part of her shoulder.        Objective   See Exercise, Manual, and Modality Logs for complete treatment.       Assessment & Plan     Assessment  Assessment details: Straining with end range flexion supine standing and with rope pull activity.   Guarding with PROM not allowing any real end range work at this time. Trying to encourage pt to push herself a bit but pain comes pretty quickly at her end range.                Timed:    Manual Therapy:    15     mins  85358;  Therapeutic Exercise:    30     mins  57894;     Neuromuscular Yue:        mins  47502;    Therapeutic Activity:          mins  41195;     Gait Training:           mins  75127;     Ultrasound:          mins  59065;    Electrical Stimulation:         mins  31159 ( );  Iontophoresis         mins 72695;  Aquatic Therapy         mins 17013;  Dry Needling                   mins    Untimed:  Electrical Stimulation:         mins  36330 ( );  Mechanical Traction:         mins  80023;     Timed Treatment:   45   mins   Total Treatment:     45   mins  Zorre Zeno Kimura, PT  Physical Therapist

## 2020-01-28 ENCOUNTER — OFFICE VISIT (OUTPATIENT)
Dept: OBSTETRICS AND GYNECOLOGY | Age: 70
End: 2020-01-28

## 2020-01-28 VITALS
WEIGHT: 188 LBS | HEIGHT: 63 IN | BODY MASS INDEX: 33.31 KG/M2 | SYSTOLIC BLOOD PRESSURE: 126 MMHG | DIASTOLIC BLOOD PRESSURE: 80 MMHG

## 2020-01-28 DIAGNOSIS — Z00.00 ENCOUNTER FOR MEDICAL EXAMINATION TO ESTABLISH CARE: Primary | ICD-10-CM

## 2020-01-28 PROBLEM — R07.9 CHEST PAIN: Status: ACTIVE | Noted: 2020-01-28

## 2020-01-28 PROCEDURE — G0101 CA SCREEN;PELVIC/BREAST EXAM: HCPCS | Performed by: OBSTETRICS & GYNECOLOGY

## 2020-01-28 NOTE — PROGRESS NOTES
Routine Annual Visit    2020    Patient: Arcelia Cole          MR#:[id number]      Chief Complaint   Patient presents with   • Establish Care     NGYN REFERRED BY RAMIRO MENDEZ.  MG 2019.  DEXA 2019. PM otc ESTROVEN.        History of Present Illness    69 y.o. female  who presents for annual exam.   Having some episodes of fleeting vaginal and cervix pain like pins and needles   No AE in awhile and wanted to get it checked out  New to me  See written gyn  No abn pap and no pap indicated today  CSC referrral made by primary care and pt will schedule soon  mammo and dexa UTD  No vaginal bleeding          No LMP recorded. Patient is postmenopausal.  Obstetric History:  OB History        2    Para   2    Term   2            AB        Living   2       SAB        TAB        Ectopic        Molar        Multiple        Live Births   2               Menstrual History:     No LMP recorded. Patient is postmenopausal.       Sexual History:       ________________________________________  Patient Active Problem List   Diagnosis   • Psoriasis   • Hypertriglyceridemia   • Impaired fasting glucose   • Vitamin D deficiency   • Hypothyroidism, acquired   • Hyperlipidemia, mixed   • Metatarsus adductus   • Hallux valgus of right foot   • Chest pain       Past Medical History:   Diagnosis Date   • Psoriasis        Past Surgical History:   Procedure Laterality Date   • MANDIBLE SURGERY     • WRIST SURGERY         Social History     Tobacco Use   Smoking Status Never Smoker   Smokeless Tobacco Never Used       has a current medication list which includes the following prescription(s): halobetasol, misc natural products, misc natural products, multiple vitamins-minerals, and probiotic product.  ________________________________________    Current contraception: post menopausal status  History of abnormal Pap smear: no  Family history of Breast cancer: no  Family history of uterine or ovarian cancer:  "no  Family History of colon cancer/colon polyps: no  History of abnormal mammogram: no      The following portions of the patient's history were reviewed and updated as appropriate: allergies, current medications, past family history, past medical history, past social history, past surgical history and problem list.    Review of Systems    Pertinent items are noted in HPI.     Objective   Physical Exam    /80   Ht 160 cm (63\")   Wt 85.3 kg (188 lb)   Breastfeeding No   BMI 33.30 kg/m²    BP Readings from Last 3 Encounters:   01/28/20 126/80   12/05/19 120/80   11/06/19 110/72      Wt Readings from Last 3 Encounters:   01/28/20 85.3 kg (188 lb)   12/19/19 85.1 kg (187 lb 9.6 oz)   12/05/19 83.5 kg (184 lb)      BMI: Estimated body mass index is 33.3 kg/m² as calculated from the following:    Height as of this encounter: 160 cm (63\").    Weight as of this encounter: 85.3 kg (188 lb).      General:   alert, appears stated age and cooperative   Abdomen: soft, non-tender, without masses or organomegaly   Breast: inspection negative, no nipple discharge or bleeding, no masses or nodularity palpable   Vulva: normal   Vagina: normal mucosa   Cervix: normal   Uterus: normal size, mobile or non-tender   Adnexa: no mass, fullness, tenderness     Assessment:    1. Normal annual exam   Assessment     ICD-10-CM ICD-9-CM   1. Encounter for medical examination to establish care Z00.00 V70.9     Plan:    Plan     []  Mammogram request made  []  PAP done  []  Labs:   []  GC/Chl/TV  []  DEXA scan   []  Referral for colonoscopy:       Arcelia was seen today for establish care.    Diagnoses and all orders for this visit:    Encounter for medical examination to establish care      Normal exam but will do US secondary to pelvic pain    Counseling:  --Nutrition: Stressed importance of moderation and caloric balance, stressed fresh fruit and vegetables  --Exercise: Stressed the importance of regular exercise. 3-5 times weekly   - " Discussed screening mammogram recommendations.   --Discussed benefits of screening colonoscopy- age 45 unless FH  --Discussed pap smear screening recommendations

## 2020-01-29 ENCOUNTER — TREATMENT (OUTPATIENT)
Dept: PHYSICAL THERAPY | Facility: CLINIC | Age: 70
End: 2020-01-29

## 2020-01-29 DIAGNOSIS — M25.512 ACUTE PAIN OF LEFT SHOULDER: Primary | ICD-10-CM

## 2020-01-29 PROCEDURE — 97110 THERAPEUTIC EXERCISES: CPT | Performed by: PHYSICAL THERAPIST

## 2020-01-30 NOTE — PROGRESS NOTES
Physical Therapy Daily Progress Note    Patient: Arcelia Cole   : 1950  Diagnosis/ICD-10 Code:  Acute pain of left shoulder [M25.512]  Referring practitioner: Rip Nicolas*  Date of Initial Visit: Type: THERAPY  Noted: 2020  Today's Date: 2020  Patient seen for 4 sessions           Subjective shoulder is doing well.  Like to self discharge having trouble doing PT and work.     Objective   See Exercise, Manual, and Modality Logs for complete treatment.       Assessment/Plan  Reviewed HEP.   (L) shoulder flexion 160 AROM;  Reviewed precautions/ restrictions.  Pt to be discharge at this time.  stg ongoing         Timed:    Manual Therapy:      mins  40094;  Therapeutic Exercise:      20   mins  04042;     Neuromuscular Yue:        mins  89144;    Therapeutic Activity:          mins  55866;     Gait Training:           mins  10653;     Ultrasound:          mins  83902;    Electrical Stimulation:         mins  27464 ( );  Iontophoresis         mins 68233;  Aquatic Therapy         mins 28974;  Dry Needling                   mins    Untimed:  Electrical Stimulation:         mins  66331 ( );  Mechanical Traction:         mins  27617;     Timed Treatment:   20   mins   Total Treatment:     20   mins  Michael Jesus, PT  Physical Therapist

## 2020-02-03 ENCOUNTER — OUTSIDE FACILITY SERVICE (OUTPATIENT)
Dept: GASTROENTEROLOGY | Facility: CLINIC | Age: 70
End: 2020-02-03

## 2020-02-03 PROCEDURE — G0121 COLON CA SCRN NOT HI RSK IND: HCPCS | Performed by: INTERNAL MEDICINE

## 2020-02-11 ENCOUNTER — OFFICE VISIT (OUTPATIENT)
Dept: OBSTETRICS AND GYNECOLOGY | Age: 70
End: 2020-02-11

## 2020-02-11 ENCOUNTER — PROCEDURE VISIT (OUTPATIENT)
Dept: OBSTETRICS AND GYNECOLOGY | Age: 70
End: 2020-02-11

## 2020-02-11 VITALS
BODY MASS INDEX: 32.96 KG/M2 | DIASTOLIC BLOOD PRESSURE: 84 MMHG | SYSTOLIC BLOOD PRESSURE: 124 MMHG | WEIGHT: 186 LBS | HEIGHT: 63 IN

## 2020-02-11 DIAGNOSIS — R10.2 PELVIC PAIN: Primary | ICD-10-CM

## 2020-02-11 PROCEDURE — 99213 OFFICE O/P EST LOW 20 MIN: CPT | Performed by: OBSTETRICS & GYNECOLOGY

## 2020-02-11 PROCEDURE — 76830 TRANSVAGINAL US NON-OB: CPT | Performed by: OBSTETRICS & GYNECOLOGY

## 2020-02-11 NOTE — PROGRESS NOTES
GYN Visit    2020    Patient: Arcelia Cole          MR#:[id number]      Chief Complaint   Patient presents with   • Pelvic Pain     U/S with DW.  Pelvic pain.       History of Present Illness    69 y.o. female  who presents for  Follow up to pelvic pain  Described as fleeting twinge, vaginal and cervix  Happens mostly when sitting for long periods at work  No bleeding, no GI issues  Reviewed US done today with pt , was normal and pt is reassured        No LMP recorded. Patient is postmenopausal.    ________________________________________  Patient Active Problem List   Diagnosis   • Psoriasis   • Hypertriglyceridemia   • Impaired fasting glucose   • Vitamin D deficiency   • Hypothyroidism, acquired   • Hyperlipidemia, mixed   • Metatarsus adductus   • Hallux valgus of right foot   • Chest pain       Past Medical History:   Diagnosis Date   • Menopause    • Psoriasis        Past Surgical History:   Procedure Laterality Date   • MANDIBLE SURGERY     • WRIST SURGERY         Social History     Tobacco Use   Smoking Status Never Smoker   Smokeless Tobacco Never Used       has a current medication list which includes the following prescription(s): halobetasol, misc natural products, misc natural products, multiple vitamins-minerals, and probiotic product.  ________________________________________    Current contraception: post menopausal status      The following portions of the patient's history were reviewed and updated as appropriate: allergies, current medications, past family history, past medical history, past social history, past surgical history and problem list.    Review of Systems   Constitutional: Negative for fatigue and unexpected weight change.   Gastrointestinal: Negative for blood in stool, nausea and vomiting.   Genitourinary: Positive for pelvic pain. Negative for vaginal bleeding and vaginal discharge.   Psychiatric/Behavioral: Negative for dysphoric mood.   All other systems reviewed  "and are negative.      Pertinent items are noted in HPI.     Objective   Physical Exam    /84   Ht 160 cm (63\")   Wt 84.4 kg (186 lb)   Breastfeeding No   BMI 32.95 kg/m²    BP Readings from Last 3 Encounters:   02/11/20 124/84   01/28/20 126/80   12/05/19 120/80      Wt Readings from Last 3 Encounters:   02/11/20 84.4 kg (186 lb)   01/28/20 85.3 kg (188 lb)   12/19/19 85.1 kg (187 lb 9.6 oz)      BMI: Estimated body mass index is 32.95 kg/m² as calculated from the following:    Height as of this encounter: 160 cm (63\").    Weight as of this encounter: 84.4 kg (186 lb).    Lungs: non labored breathing, no wheezing or tachpnea  Extremities: extremities normal, atraumatic, no cyanosis or edema  Skin: Skin color, texture, turgor normal. No rashes or lesions  Neurologic: Grossly normal  General:   alert, appears stated age and cooperative   Abdomen: soft, non-tender, without masses or organomegaly       Vulva: normal   Vagina: normal mucosa   Cervix: no cervical motion tenderness and no lesions   Uterus: normal size, mobile or non-tender   Adnexa: no mass, fullness, tenderness     Assessment:      Arcelia was seen today for pelvic pain.    Diagnoses and all orders for this visit:    Pelvic pain      See US report, normal , lining is thin, uterus is normal and ovaries normal, no free fluid  Pt is reassured        "

## 2020-02-27 ENCOUNTER — RESULTS ENCOUNTER (OUTPATIENT)
Dept: FAMILY MEDICINE CLINIC | Facility: CLINIC | Age: 70
End: 2020-02-27

## 2020-02-27 DIAGNOSIS — E55.9 VITAMIN D DEFICIENCY: ICD-10-CM

## 2020-02-27 DIAGNOSIS — R73.01 IMPAIRED FASTING GLUCOSE: ICD-10-CM

## 2020-02-27 DIAGNOSIS — E78.2 HYPERLIPIDEMIA, MIXED: ICD-10-CM

## 2020-02-27 DIAGNOSIS — E03.9 HYPOTHYROIDISM, ACQUIRED: ICD-10-CM

## 2020-06-09 ENCOUNTER — OFFICE VISIT (OUTPATIENT)
Dept: FAMILY MEDICINE CLINIC | Facility: CLINIC | Age: 70
End: 2020-06-09

## 2020-06-09 VITALS
HEIGHT: 63 IN | SYSTOLIC BLOOD PRESSURE: 120 MMHG | BODY MASS INDEX: 32.96 KG/M2 | DIASTOLIC BLOOD PRESSURE: 84 MMHG | WEIGHT: 186 LBS | HEART RATE: 88 BPM | OXYGEN SATURATION: 96 % | RESPIRATION RATE: 16 BRPM | TEMPERATURE: 97.4 F

## 2020-06-09 DIAGNOSIS — M54.40 BACK PAIN OF LUMBAR REGION WITH SCIATICA: Primary | ICD-10-CM

## 2020-06-09 PROCEDURE — 99213 OFFICE O/P EST LOW 20 MIN: CPT | Performed by: PHYSICIAN ASSISTANT

## 2020-06-09 RX ORDER — METHOCARBAMOL 500 MG/1
500 TABLET, FILM COATED ORAL 3 TIMES DAILY
Qty: 45 TABLET | Refills: 1 | Status: SHIPPED | OUTPATIENT
Start: 2020-06-09 | End: 2020-06-29

## 2020-06-09 RX ORDER — LIDOCAINE 50 MG/G
1 PATCH TOPICAL EVERY 24 HOURS
Qty: 30 PATCH | Refills: 1 | Status: SHIPPED | OUTPATIENT
Start: 2020-06-09 | End: 2021-06-22 | Stop reason: SDDI

## 2020-06-09 NOTE — PROGRESS NOTES
Subjective   Arcelia Cole is a 69 y.o. female.     History of Present Illness   Arcelia Cole 69 y.o. female presents for evaluation and treatment of  low back pain. The patient first noted symptoms 10 days ago. It was related to new chair while working from home last few months. no fall.  The pain intensity is moderate and severe , and is located left side, mid buttock, posterior thigh, left leg and piriformis area. The pain is described as aching, spasmotic and no N/T; some burning down left leg. No legs giving way. occas weakness. no change in bladder or bowel control and occurs most of the time; some relief to lay down.. The symptoms are not progressive. Symptoms are exacerbated by changing position, light exertion and prolonged sitting, standing, and laying. Factors which relieve the pain include muscle relaxants and APAP and Medrol dalila. Other associated symptoms include weakness in the left leg, burning pain in the left leg, denies pain radiating down right leg, denies motor loss, denies sensory loss, denies weakness in right leg, denies loss of muscle tone and denies loss of bowel or bladder control. Previous history of symptoms: never. Treatment efforts have included went to [facility] 6-3-20; no xray; given Medrol dalila, Robaxin, APAP , with some relief.  Some help Robaxin  Lab Results   Component Value Date    HGBA1C 6.40 (H) 11/07/2019   Medrol did really help     The following portions of the patient's history were reviewed and updated as appropriate: allergies, current medications, past family history, past medical history, past social history, past surgical history and problem list.    Review of Systems   Constitutional: Negative for activity change, appetite change and unexpected weight change.   HENT: Negative for nosebleeds and trouble swallowing.    Eyes: Negative for pain and visual disturbance.   Respiratory: Negative for chest tightness, shortness of breath and wheezing.    Cardiovascular:  Negative for chest pain and palpitations.   Gastrointestinal: Negative for abdominal pain and blood in stool.   Endocrine: Negative.    Genitourinary: Negative for difficulty urinating and hematuria.   Musculoskeletal: Positive for back pain. Negative for joint swelling.   Skin: Negative for color change and rash.   Allergic/Immunologic: Negative.    Neurological: Positive for weakness and numbness. Negative for syncope and speech difficulty.   Hematological: Negative for adenopathy.   Psychiatric/Behavioral: Negative for agitation and confusion.   All other systems reviewed and are negative.      Objective   Physical Exam   Constitutional: She appears well-developed and well-nourished. No distress.   HENT:   Head: Normocephalic and atraumatic.   Eyes: Conjunctivae are normal. No scleral icterus.   Neck: Normal range of motion. Neck supple.   Cardiovascular: Normal rate and regular rhythm.   Pulmonary/Chest: Effort normal and breath sounds normal.   Abdominal: Soft. There is no tenderness.   Musculoskeletal: She exhibits tenderness (left piriformis and left buttock). She exhibits no deformity.        Lumbar back: She exhibits decreased range of motion and tenderness.   ROM intact but pain mid lumbar to left-concentrated in mid buttock left    No pain left hip with palp and ROM   Neurological: She is alert. She has normal reflexes. She displays no atrophy, no tremor and normal reflexes. No sensory deficit. She exhibits normal muscle tone. Coordination (difficult to ambulate) abnormal.   Reflex Scores:       Patellar reflexes are 2+ on the right side and 2+ on the left side.       Achilles reflexes are 2+ on the right side and 2+ on the left side.  Skin: Skin is warm and dry. No rash noted. She is not diaphoretic.   Psychiatric: She has a normal mood and affect.   Nursing note and vitals reviewed.      Assessment/Plan   Arcelia was seen today for back pain.    Diagnoses and all orders for this visit:    Back pain of  lumbar region with sciatica  -     XR Spine Lumbar Complete 4+VW; Future  -     Ambulatory Referral to Physical Therapy Evaluate and treat    need imaging  Refer PT  Will Medrol dalila once more---it is only thing that helped

## 2020-06-18 ENCOUNTER — TREATMENT (OUTPATIENT)
Dept: PHYSICAL THERAPY | Facility: CLINIC | Age: 70
End: 2020-06-18

## 2020-06-18 ENCOUNTER — HOSPITAL ENCOUNTER (OUTPATIENT)
Dept: GENERAL RADIOLOGY | Facility: HOSPITAL | Age: 70
Discharge: HOME | End: 2020-06-18
Admitting: PHYSICIAN ASSISTANT

## 2020-06-18 DIAGNOSIS — M54.40 BACK PAIN OF LUMBAR REGION WITH SCIATICA: Primary | ICD-10-CM

## 2020-06-18 DIAGNOSIS — M54.40 BACK PAIN OF LUMBAR REGION WITH SCIATICA: ICD-10-CM

## 2020-06-18 PROCEDURE — 97110 THERAPEUTIC EXERCISES: CPT | Performed by: PHYSICAL THERAPIST

## 2020-06-18 PROCEDURE — G0283 ELEC STIM OTHER THAN WOUND: HCPCS | Performed by: PHYSICAL THERAPIST

## 2020-06-18 PROCEDURE — 97161 PT EVAL LOW COMPLEX 20 MIN: CPT | Performed by: PHYSICAL THERAPIST

## 2020-06-18 PROCEDURE — 72110 X-RAY EXAM L-2 SPINE 4/>VWS: CPT

## 2020-06-18 NOTE — PROGRESS NOTES
Physical Therapy Initial Evaluation and Plan of Care    Patient: Arcelia Cole   : 1950  Diagnosis/ICD-10 Code:  Back pain of lumbar region with sciatica [M54.40]  Referring practitioner: Ana Pantoja PA-C  Date of Initial Visit: 2020  Today's Date: 2020    Subjective Questionnaire: Oswestry: 70% limitation    Subjective Evaluation    History of Present Illness  Onset date: 3 weeks ago.  Mechanism of injury: Pt reports onset of low back pain ~3 weeks ago after sitting in dining room chair instead of office chair while working at home. Pain is in left lumbar, buttock, left lateral knee/calf. Pt gets some relief with bending forward while sitting. Denies numbness/tingling. Saw MD 2 weeks ago, gave her a steroid pack. Pt is now using office chair with lumbar support. Pt has been doing stretches and using TENS unit at home, with temporary relief but overall unchanged. Can't sit in a chair for longer than 5 minutes.       Patient Occupation:  Quality of life: good    Pain  Current pain ratin  At best pain ratin  At worst pain rating: 10  Location: left low back/posterior hip  Quality: sharp and dull ache  Relieving factors: medications (muscle relaxer, stretches, TENS unit)  Aggravating factors: standing (sitting or standing for long periods)    Social Support  Lives in: multiple-level home (bedroom is on 2nd floor, hasn't been going upstairs for 2 weeks)  Lives with: spouse    Diagnostic Tests  Abnormal x-ray: see report in chart.    Treatments  Previous treatment: medication  Patient Goals  Patient goals for therapy: decreased pain             Objective        Special Questions      Additional Special Questions  No red flags noted      Palpation   Left   No palpable tenderness to the iliopsoas and quadratus lumborum.   Hypertonic in the erector spinae and lumbar paraspinals.   Tenderness of the erector spinae and lumbar paraspinals.     Right   No palpable tenderness to the  iliopsoas and quadratus lumborum.   Hypertonic in the erector spinae and lumbar paraspinals. Tenderness of the erector spinae and lumbar paraspinals.     Tenderness     Lumbar Spine  No tenderness in the facet joint.     Left Hip   Tenderness in the PSIS and sacroiliac joint. No tenderness in the ASIS and greater trochanter.     Right Hip   No tenderness in the ASIS and PSIS.     Neurological Testing     Sensation     Lumbar   Left   Intact: light touch    Right   Intact: light touch    Active Range of Motion     Lumbar   Flexion: 55 degrees   Extension: 15 degrees with pain  Left lateral flexion: 25 degrees   Right lateral flexion: 25 degrees     Strength/Myotome Testing     Left Hip   Planes of Motion   Flexion: 4  Extension: 3-  Abduction: 3-  Adduction: 4-    Right Hip   Planes of Motion   Flexion: 4+  Extension: 4-  Abduction: 4-  Adduction: 4    Left Knee   Flexion: 4  Extension: 5    Right Knee   Flexion: 5  Extension: 5    Left Ankle/Foot   Dorsiflexion: 5  Plantar flexion: 5  Great toe extension: 5    Right Ankle/Foot   Dorsiflexion: 5  Plantar flexion: 5  Great toe extension: 5    Muscle Activation     Additional Muscle Activation Details  Pt with decreased stabilization with TrA during transitional movements    Tests     Lumbar   Negative repeated extension and repeated flexion.     Left   Negative passive SLR and quadrant.     Right   Negative passive SLR and quadrant.     Left Pelvic Girdle/Sacrum   Positive: sacral spring.     Right Pelvic Girdle/Sacrum   Negative: sacral spring.     Left Hip   Positive JEREMIE and piriformis.   Negative scour.   Shahriar: Positive.     Right Hip   Negative JEREMIE, piriformis and scour.   Shahriar: Positive.     Ambulation     Observational Gait   Gait: antalgic   Decreased walking speed and stride length.     Functional Assessment     Comments  Pt requires UE assist for sit to stand and supine to/from sit secondary to pain          Assessment & Plan      Assessment  Impairments: activity intolerance, impaired physical strength, lacks appropriate home exercise program and pain with function  Assessment details: Ms. Cole is a pleasant 69 year old female who presents with lumbosacral pain with radiculopathy, decreased LE flexibility, and core weakness. She will benefit from skilled PT services in order to address impairments and facilitate return to normal daily activities including ADL's, work and recreational activities.      Prognosis: good  Functional Limitations: lifting, sleeping, walking, uncomfortable because of pain, moving in bed and sitting  Goals  Plan Goals: Short Term Goals: 4 weeks. Patient will:  1. Be independent with initial HEP  2. Be instructed in posture and body mechanics  3. Demonstrate TrA contraction during exercises in clinic without need for cueing.  4. Report ability to sit >/= 30 minutes with </= 2/10 pain to facilitate return to work.    Long Term Goals: 8 weeks. Patient will:  1. Demonstrate improved Bilateral lower extremity/lower abdominal MMT of >/= 4+/5 to allow for performance of daily activities without pain.  2. Demonstrate lower extremity flexibility and lumbar ROM WFL to allow for return to household & recreational activities w/o increased symptoms  3. Report ability to sit for 1 hour and walk for 10 minutes with </=1/10 pain to facilitate return to normal work duties and home management/grocery shopping.   4. Perceived disability </=40% as measured by Oswestry Questionnaire.  5. Be independent with long term HEP    Plan  Therapy options: will be seen for skilled physical therapy services  Planned modality interventions: cryotherapy, thermotherapy (hydrocollator packs), TENS, ultrasound and traction  Planned therapy interventions: abdominal trunk stabilization, manual therapy, neuromuscular re-education, body mechanics training, postural training, soft tissue mobilization, spinal/joint mobilization, strengthening,  stretching, home exercise program, functional ROM exercises and flexibility  Frequency: 1-2x per week.  Duration in weeks: 12  Treatment plan discussed with: patient        Manual Therapy:    0     mins  76695;  Therapeutic Exercise:    8     mins  36904;     Neuromuscular Yue:    0    mins  32475;    Therapeutic Activity:     0     mins  25907;     Gait Trainin     mins  72214;     Ultrasound:     0     mins  74889;    Electrical Stimulation:    15     mins  59535 ( );    Timed Treatment:   8   mins   Total Treatment:     55   mins    PT SIGNATURE: Ana Rodriguez PT, DPT          Physical Therapist                               [place] License #[id number]    DATE TREATMENT INITIATED: 2020    Initial Certification  Certification Period: 2020  I certify that the therapy services are furnished while this patient is under my care.  The services outlined above are required by this patient, and will be reviewed every 90 days.     PHYSICIAN: Ana Pantoja, ANA      DATE:     Please sign and return via fax to [phone].. Thank you, [facility].

## 2020-06-18 NOTE — PATIENT INSTRUCTIONS
Pt was educated regarding relevant anatomy/physiology and plan of care.      Access Code: [email or web address]   URL: [email or web address]   Date: 06/18/2020   Prepared by: Ana Rodriguez     Exercises   Supine LE Neural Mobilization - 10 reps - 2 sets - 7 weekly - 2x daily   Supine Transversus Abdominis Bracing - Hands on Stomach - 10 reps - 5 hold - 1 sets - 2x daily

## 2020-06-26 ENCOUNTER — TREATMENT (OUTPATIENT)
Dept: PHYSICAL THERAPY | Facility: CLINIC | Age: 70
End: 2020-06-26

## 2020-06-26 DIAGNOSIS — M54.40 BACK PAIN OF LUMBAR REGION WITH SCIATICA: Primary | ICD-10-CM

## 2020-06-26 PROCEDURE — G0283 ELEC STIM OTHER THAN WOUND: HCPCS | Performed by: PHYSICAL THERAPIST

## 2020-06-26 PROCEDURE — 97140 MANUAL THERAPY 1/> REGIONS: CPT | Performed by: PHYSICAL THERAPIST

## 2020-06-26 NOTE — PROGRESS NOTES
Physical Therapy Daily Progress Note    Visit #2    Subjective     Arcelia Cole reports: she was feeling better until Tuesday when she did some grocery shopping at [employer], has been much worse ever since. Left posterior hip pain radiating into left anterolateral thigh.      Objective   See Exercise, Manual, and Modality Logs for complete treatment.       Assessment/Plan  Pt with symptom reduction with manual therapy and ESTIM/heat. Advised pt to contact MD office regarding spike in pain, continue with HEP from previous visit and walk when able.   Progress per Plan of Care           Manual Therapy:    30     mins  35578;  Therapeutic Exercise:    0     mins  22983;     Neuromuscular Yue:    0    mins  52166;    Therapeutic Activity:     0     mins  51160;     Gait Trainin     mins  96221;     Ultrasound:     0     mins  66459;    Electrical Stimulation:    15     mins  91549 ( );    Timed Treatment:   30   mins   Total Treatment:     45   mins    Ana Rodriguez PT, DPT  Physical Therapist  [place] License #[id number]

## 2020-06-29 ENCOUNTER — TREATMENT (OUTPATIENT)
Dept: PHYSICAL THERAPY | Facility: CLINIC | Age: 70
End: 2020-06-29

## 2020-06-29 ENCOUNTER — OFFICE VISIT (OUTPATIENT)
Dept: FAMILY MEDICINE CLINIC | Facility: CLINIC | Age: 70
End: 2020-06-29

## 2020-06-29 VITALS
HEART RATE: 88 BPM | OXYGEN SATURATION: 98 % | RESPIRATION RATE: 18 BRPM | WEIGHT: 189 LBS | DIASTOLIC BLOOD PRESSURE: 70 MMHG | SYSTOLIC BLOOD PRESSURE: 122 MMHG | HEIGHT: 63 IN | BODY MASS INDEX: 33.49 KG/M2 | TEMPERATURE: 97.6 F

## 2020-06-29 DIAGNOSIS — M54.16 LUMBAR RADICULAR PAIN: ICD-10-CM

## 2020-06-29 DIAGNOSIS — M54.40 BACK PAIN OF LUMBAR REGION WITH SCIATICA: Primary | ICD-10-CM

## 2020-06-29 DIAGNOSIS — M54.50 LUMBAR PAIN: Primary | ICD-10-CM

## 2020-06-29 PROCEDURE — 99213 OFFICE O/P EST LOW 20 MIN: CPT | Performed by: PHYSICIAN ASSISTANT

## 2020-06-29 PROCEDURE — G0283 ELEC STIM OTHER THAN WOUND: HCPCS | Performed by: PHYSICAL THERAPIST

## 2020-06-29 PROCEDURE — 97140 MANUAL THERAPY 1/> REGIONS: CPT | Performed by: PHYSICAL THERAPIST

## 2020-06-29 RX ORDER — METHOCARBAMOL 500 MG/1
500 TABLET, FILM COATED ORAL 3 TIMES DAILY
Qty: 60 TABLET | Refills: 2 | Status: SHIPPED | OUTPATIENT
Start: 2020-06-29 | End: 2021-06-22 | Stop reason: SDDI

## 2020-06-29 RX ORDER — ACETAMINOPHEN 500 MG
500 TABLET ORAL EVERY 6 HOURS PRN
COMMUNITY

## 2020-06-29 RX ORDER — IBUPROFEN 200 MG
200 TABLET ORAL EVERY 6 HOURS PRN
COMMUNITY
End: 2023-03-21

## 2020-06-29 RX ORDER — METHYLPREDNISOLONE 4 MG/1
TABLET ORAL
Qty: 21 TABLET | Refills: 0 | Status: SHIPPED | OUTPATIENT
Start: 2020-06-29 | End: 2021-06-22 | Stop reason: SDDI

## 2020-06-29 NOTE — PROGRESS NOTES
Subjective   Arcelia Cole is a 69 y.o. female.     History of Present Illness   Arcelia Cole 69 y.o. female presents for re-evaluation and treatment of  low back pain. The patient first noted symptoms 1 month ago. It was related to ??sitting in dining chair at home---working from home. I saw her for this 6-9-20 and had been to  6-3-20.  The pain intensity is moderate and severe , and is located left side, mid buttock, left leg and piriformis area. The pain is described as aching and burning and occurs constantly. The symptoms are not progressive. Symptoms are exacerbated by light exertion and prolonged sitting, standing, and laying. Factors which relieve the pain include NSAID's, heat, Physical Therapy treatment, muscle relaxants, repositioning and had Medrol dalila. Other associated symptoms include denies pain radiating down right leg, denies motor loss, denies sensory loss, denies N/T in legs, denies weakness in legs and some burning and aching right anterior lateral thigh. Previous history of symptoms: never. Treatment efforts have included NSAID's, heat, Physical Therapy treatment, muscle relaxants, repositioning and using Tens Unit , with and without relief.  Tens Unit helps  Will refill Robaxin    She did not fill the medrol dalila last visit.  It helped from .  I will send again. Unable to work.  The following portions of the patient's history were reviewed and updated as appropriate: allergies, current medications, past family history, past medical history, past social history, past surgical history and problem list.    Review of Systems   Constitutional: Negative for activity change, appetite change and unexpected weight change.   HENT: Negative for nosebleeds and trouble swallowing.    Eyes: Negative for pain and visual disturbance.   Respiratory: Negative for chest tightness, shortness of breath and wheezing.    Cardiovascular: Negative for chest pain and palpitations.   Gastrointestinal: Negative  for abdominal pain and blood in stool.   Endocrine: Negative.    Genitourinary: Negative for difficulty urinating and hematuria.   Musculoskeletal: Negative for joint swelling.   Skin: Negative for color change and rash.   Allergic/Immunologic: Negative.    Neurological: Negative for syncope and speech difficulty.   Hematological: Negative for adenopathy.   Psychiatric/Behavioral: Negative for agitation and confusion.   All other systems reviewed and are negative.      Objective   Physical Exam   Constitutional: She appears well-developed and well-nourished. No distress.   HENT:   Head: Normocephalic and atraumatic.   Eyes: Conjunctivae are normal. No scleral icterus.   Neck: Normal range of motion. Neck supple.   Cardiovascular: Normal rate and regular rhythm.   Pulmonary/Chest: Effort normal and breath sounds normal.   Abdominal: Soft. There is no tenderness.   Musculoskeletal: She exhibits no tenderness (left piriformis and left buttock) or deformity.        Lumbar back: She exhibits decreased range of motion.   ROM intact but no pain now d/t just went to PT=-has  mid lumbar to left-concentrated in mid buttock left--not sore now--just went to PT;    No pain left hip with palp and ROM   Neurological: She is alert. She has normal reflexes. She displays no atrophy, no tremor and normal reflexes. No sensory deficit. She exhibits normal muscle tone. Coordination (difficult to ambulate) normal.   Reflex Scores:       Patellar reflexes are 2+ on the right side and 2+ on the left side.       Achilles reflexes are 2+ on the right side and 2+ on the left side.  Skin: Skin is warm and dry. No rash noted. She is not diaphoretic.   Psychiatric: She has a normal mood and affect.   Nursing note and vitals reviewed.      Assessment/Plan   Arcelia was seen today for back pain.    Diagnoses and all orders for this visit:    Lumbar pain    Lumbar radicular pain    Other orders  -     methylPREDNISolone (MEDROL, NOLAN,) 4 MG tablet;  follow package directions  -     methocarbamol (Robaxin) 500 MG tablet; Take 1 tablet by mouth 3 (Three) Times a Day. For spasms PRN        Resent Medrol  Refill Robaxin--watch drowsiness   Cont. PT

## 2020-06-29 NOTE — PATIENT INSTRUCTIONS

## 2020-06-29 NOTE — PROGRESS NOTES
Physical Therapy Daily Progress Note    Visit #3    Subjective     Arcelia Cole reports: symptoms were improved after last session, but returned within a day. Still unable to sit for work. Is adherent with exercises.  Is seeing MD today.      Objective   See Exercise, Manual, and Modality Logs for complete treatment.       Assessment/Plan  Improved muscle tone in TFL, piriformis, paraspinals compared to last session.   Progress per Plan of Care           Manual Therapy:    30     mins  12933;  Therapeutic Exercise:    0     mins  31225;     Neuromuscular Yue:    0    mins  27775;    Therapeutic Activity:     0     mins  16278;     Gait Trainin     mins  24195;     Ultrasound:     0     mins  75044;    Electrical Stimulation:    15     mins  80246 ( );    Timed Treatment:   30   mins   Total Treatment:     45   mins    Ana Rodriguez PT, DPT  Physical Therapist  [place] License #[id number]

## 2020-07-02 ENCOUNTER — TREATMENT (OUTPATIENT)
Dept: PHYSICAL THERAPY | Facility: CLINIC | Age: 70
End: 2020-07-02

## 2020-07-02 DIAGNOSIS — M54.40 BACK PAIN OF LUMBAR REGION WITH SCIATICA: Primary | ICD-10-CM

## 2020-07-02 PROCEDURE — 97140 MANUAL THERAPY 1/> REGIONS: CPT | Performed by: PHYSICAL THERAPIST

## 2020-07-02 PROCEDURE — G0283 ELEC STIM OTHER THAN WOUND: HCPCS | Performed by: PHYSICAL THERAPIST

## 2020-07-02 PROCEDURE — 97110 THERAPEUTIC EXERCISES: CPT | Performed by: PHYSICAL THERAPIST

## 2020-07-02 NOTE — PATIENT INSTRUCTIONS
Access Code: [email or web address]   URL: [email or web address]   Date: 07/02/2020   Prepared by: Ana Rodriguez     Exercises   Supine Figure 4 Piriformis Stretch - 3 reps - 20 hold - 2x daily   Supine ITB Stretch - 3 reps - 20 hold - 2x daily   Supine Lower Trunk Rotation - 6 reps - 1 sets - 10 hold - 1x daily

## 2020-07-02 NOTE — PROGRESS NOTES
Physical Therapy Daily Progress Note    Visit #4    Subjective     Arcelia Cole reports: significant improvement after Monday's treatment. She also saw MD, who prescribed another round of prednisone and muscle relaxers.      Objective   See Exercise, Manual, and Modality Logs for complete treatment.       Assessment/Plan  Progressed stretching program for HEP, tolerated well without pain. Written copy provided.   Progress per Plan of Care           Manual Therapy:    15     mins  80913;  Therapeutic Exercise:    18     mins  55694;     Neuromuscular Yue:    0    mins  91492;    Therapeutic Activity:     0     mins  47780;     Gait Trainin     mins  32114;     Ultrasound:     0     mins  98569;    Electrical Stimulation:    15     mins  33443 ( );    Timed Treatment:   33   mins   Total Treatment:     50   mins    Ana Rodriguez PT, DPT  Physical Therapist  [place] License #[id number]

## 2020-07-06 ENCOUNTER — TREATMENT (OUTPATIENT)
Dept: PHYSICAL THERAPY | Facility: CLINIC | Age: 70
End: 2020-07-06

## 2020-07-06 DIAGNOSIS — M54.40 BACK PAIN OF LUMBAR REGION WITH SCIATICA: Primary | ICD-10-CM

## 2020-07-06 PROCEDURE — 97110 THERAPEUTIC EXERCISES: CPT | Performed by: PHYSICAL THERAPIST

## 2020-07-06 PROCEDURE — G0283 ELEC STIM OTHER THAN WOUND: HCPCS | Performed by: PHYSICAL THERAPIST

## 2020-07-06 PROCEDURE — 97140 MANUAL THERAPY 1/> REGIONS: CPT | Performed by: PHYSICAL THERAPIST

## 2020-07-06 NOTE — PROGRESS NOTES
Physical Therapy Daily Progress Note    Visit #5    Subjective     Arcelia Cole reports: significant improvement in symptoms, is able to work for 3 hours at a time (usual shift is 5 hours). Feels like this can be her last PT session.      Objective   See Exercise, Manual, and Modality Logs for complete treatment.       Assessment/Plan  Educated regarding the importance of continuing stretches at home, advised pt to begin taking walks to offset time sitting for work. Will hold chart open 30 days in case of acute exacerbation, otherwise D/C.           Manual Therapy:    10     mins  36107;  Therapeutic Exercise:    20     mins  15584;     Neuromuscular Yue:    0    mins  53778;    Therapeutic Activity:     0     mins  45165;     Gait Trainin     mins  12210;     Ultrasound:     0     mins  14659;    Electrical Stimulation:    15     mins  92027 ( );    Timed Treatment:   30   mins   Total Treatment:     45   mins    Ana Rodriguez PT, DPT  Physical Therapist  [place] License #[id number]

## 2020-12-11 ENCOUNTER — OFFICE VISIT (OUTPATIENT)
Dept: FAMILY MEDICINE CLINIC | Facility: CLINIC | Age: 70
End: 2020-12-11

## 2020-12-11 VITALS
BODY MASS INDEX: 32.6 KG/M2 | TEMPERATURE: 97.8 F | SYSTOLIC BLOOD PRESSURE: 160 MMHG | WEIGHT: 184 LBS | RESPIRATION RATE: 16 BRPM | HEIGHT: 63 IN | DIASTOLIC BLOOD PRESSURE: 90 MMHG | HEART RATE: 83 BPM | OXYGEN SATURATION: 98 %

## 2020-12-11 DIAGNOSIS — G89.29 CHRONIC LEFT SHOULDER PAIN: Primary | ICD-10-CM

## 2020-12-11 DIAGNOSIS — M25.512 CHRONIC LEFT SHOULDER PAIN: Primary | ICD-10-CM

## 2020-12-11 PROCEDURE — 73030 X-RAY EXAM OF SHOULDER: CPT | Performed by: NURSE PRACTITIONER

## 2020-12-11 PROCEDURE — 99213 OFFICE O/P EST LOW 20 MIN: CPT | Performed by: NURSE PRACTITIONER

## 2020-12-11 NOTE — PROGRESS NOTES
Subjective   Arcelia Cole is a 70 y.o. female.     History of Present Illness   Patient complains of left shoulder pain. The symptoms began several months ago.  Pain is a result of no known event. Pain is located shoulder region. Discomfort is described as aching and soreness. Symptoms are exacerbated by pressure applied to area and abduction and flexion .  Evaluation to date: none. Therapy to date includes: physical therapy which was somewhat effective      The following portions of the patient's history were reviewed and updated as appropriate: allergies, current medications, past family history, past medical history, past social history, past surgical history and problem list.    Review of Systems   Constitutional: Negative for unexpected weight change.   Respiratory: Negative for shortness of breath.    Cardiovascular: Negative for chest pain and palpitations.   Musculoskeletal: Positive for arthralgias. Negative for joint swelling.   Psychiatric/Behavioral: Negative for behavioral problems.       Objective   Physical Exam  Vitals signs and nursing note reviewed.   Constitutional:       Appearance: She is well-developed.   Pulmonary:      Effort: Pulmonary effort is normal.   Neurological:      Mental Status: She is alert and oriented to person, place, and time.   Psychiatric:         Mood and Affect: Mood normal.         Behavior: Behavior normal.         Thought Content: Thought content normal.         Judgment: Judgment normal.     2 view xray of left shoulder ordered and reviewed by me.  No significant joint space narrowing noted.  No comparison on file.     Assessment/Plan   Diagnoses and all orders for this visit:    1. Chronic left shoulder pain (Primary)  -     XR Shoulder 2+ View Left (In Office)    Other orders  -     Cancel: Tdap Vaccine Greater Than or Equal To 6yo IM          Patient will schedule own appt with ortho.

## 2021-03-19 ENCOUNTER — BULK ORDERING (OUTPATIENT)
Dept: CASE MANAGEMENT | Facility: OTHER | Age: 71
End: 2021-03-19

## 2021-03-19 DIAGNOSIS — Z23 IMMUNIZATION DUE: ICD-10-CM

## 2021-05-07 ENCOUNTER — APPOINTMENT (OUTPATIENT)
Dept: GENERAL RADIOLOGY | Facility: HOSPITAL | Age: 71
End: 2021-05-07

## 2021-05-07 ENCOUNTER — HOSPITAL ENCOUNTER (EMERGENCY)
Facility: HOSPITAL | Age: 71
Discharge: HOME | End: 2021-05-07
Attending: EMERGENCY MEDICINE | Admitting: EMERGENCY MEDICINE

## 2021-05-07 VITALS
SYSTOLIC BLOOD PRESSURE: 169 MMHG | RESPIRATION RATE: 16 BRPM | OXYGEN SATURATION: 96 % | HEART RATE: 78 BPM | TEMPERATURE: 97.3 F | DIASTOLIC BLOOD PRESSURE: 99 MMHG

## 2021-05-07 DIAGNOSIS — R73.9 ELEVATED BLOOD SUGAR LEVEL: ICD-10-CM

## 2021-05-07 DIAGNOSIS — R07.89 ATYPICAL CHEST PAIN: Primary | ICD-10-CM

## 2021-05-07 DIAGNOSIS — R03.0 ELEVATED BLOOD PRESSURE READING: ICD-10-CM

## 2021-05-07 LAB
ALBUMIN SERPL-MCNC: 4.2 G/DL (ref 3.5–5.2)
ALBUMIN/GLOB SERPL: 1.5 G/DL
ALP SERPL-CCNC: 97 U/L (ref 39–117)
ALT SERPL W P-5'-P-CCNC: 22 U/L (ref 1–33)
ANION GAP SERPL CALCULATED.3IONS-SCNC: 11.2 MMOL/L (ref 5–15)
AST SERPL-CCNC: 16 U/L (ref 1–32)
BASOPHILS # BLD AUTO: 0.1 10*3/MM3 (ref 0–0.2)
BASOPHILS NFR BLD AUTO: 1.2 % (ref 0–1.5)
BILIRUB SERPL-MCNC: 0.3 MG/DL (ref 0–1.2)
BUN SERPL-MCNC: 9 MG/DL (ref 8–23)
BUN/CREAT SERPL: 12.2 (ref 7–25)
CALCIUM SPEC-SCNC: 9 MG/DL (ref 8.6–10.5)
CHLORIDE SERPL-SCNC: 105 MMOL/L (ref 98–107)
CO2 SERPL-SCNC: 23.8 MMOL/L (ref 22–29)
CREAT SERPL-MCNC: 0.74 MG/DL (ref 0.57–1)
D DIMER PPP FEU-MCNC: 0.48 MCGFEU/ML (ref 0–0.49)
DEPRECATED RDW RBC AUTO: 43.3 FL (ref 37–54)
EOSINOPHIL # BLD AUTO: 0.23 10*3/MM3 (ref 0–0.4)
EOSINOPHIL NFR BLD AUTO: 2.8 % (ref 0.3–6.2)
ERYTHROCYTE [DISTWIDTH] IN BLOOD BY AUTOMATED COUNT: 13 % (ref 12.3–15.4)
GFR SERPL CREATININE-BSD FRML MDRD: 78 ML/MIN/1.73
GLOBULIN UR ELPH-MCNC: 2.8 GM/DL
GLUCOSE BLDC GLUCOMTR-MCNC: 151 MG/DL (ref 70–130)
GLUCOSE SERPL-MCNC: 156 MG/DL (ref 65–99)
HCT VFR BLD AUTO: 45.1 % (ref 34–46.6)
HGB BLD-MCNC: 15 G/DL (ref 12–15.9)
IMM GRANULOCYTES # BLD AUTO: 0.05 10*3/MM3 (ref 0–0.05)
IMM GRANULOCYTES NFR BLD AUTO: 0.6 % (ref 0–0.5)
INR PPP: 0.94 (ref 0.9–1.1)
LIPASE SERPL-CCNC: 28 U/L (ref 13–60)
LYMPHOCYTES # BLD AUTO: 1.81 10*3/MM3 (ref 0.7–3.1)
LYMPHOCYTES NFR BLD AUTO: 22.4 % (ref 19.6–45.3)
MCH RBC QN AUTO: 30.1 PG (ref 26.6–33)
MCHC RBC AUTO-ENTMCNC: 33.3 G/DL (ref 31.5–35.7)
MCV RBC AUTO: 90.6 FL (ref 79–97)
MONOCYTES # BLD AUTO: 0.59 10*3/MM3 (ref 0.1–0.9)
MONOCYTES NFR BLD AUTO: 7.3 % (ref 5–12)
NEUTROPHILS NFR BLD AUTO: 5.31 10*3/MM3 (ref 1.7–7)
NEUTROPHILS NFR BLD AUTO: 65.7 % (ref 42.7–76)
NRBC BLD AUTO-RTO: 0 /100 WBC (ref 0–0.2)
PLATELET # BLD AUTO: 296 10*3/MM3 (ref 140–450)
PMV BLD AUTO: 11.4 FL (ref 6–12)
POTASSIUM SERPL-SCNC: 4.1 MMOL/L (ref 3.5–5.2)
PROT SERPL-MCNC: 7 G/DL (ref 6–8.5)
PROTHROMBIN TIME: 12.4 SECONDS (ref 11.7–14.2)
RBC # BLD AUTO: 4.98 10*6/MM3 (ref 3.77–5.28)
SODIUM SERPL-SCNC: 140 MMOL/L (ref 136–145)
TROPONIN T SERPL-MCNC: <0.01 NG/ML (ref 0–0.03)
TROPONIN T SERPL-MCNC: <0.01 NG/ML (ref 0–0.03)
WBC # BLD AUTO: 8.09 10*3/MM3 (ref 3.4–10.8)

## 2021-05-07 PROCEDURE — 80053 COMPREHEN METABOLIC PANEL: CPT | Performed by: EMERGENCY MEDICINE

## 2021-05-07 PROCEDURE — 93005 ELECTROCARDIOGRAM TRACING: CPT

## 2021-05-07 PROCEDURE — 85025 COMPLETE CBC W/AUTO DIFF WBC: CPT | Performed by: EMERGENCY MEDICINE

## 2021-05-07 PROCEDURE — 85379 FIBRIN DEGRADATION QUANT: CPT | Performed by: EMERGENCY MEDICINE

## 2021-05-07 PROCEDURE — 82962 GLUCOSE BLOOD TEST: CPT

## 2021-05-07 PROCEDURE — 85610 PROTHROMBIN TIME: CPT | Performed by: EMERGENCY MEDICINE

## 2021-05-07 PROCEDURE — 99284 EMERGENCY DEPT VISIT MOD MDM: CPT

## 2021-05-07 PROCEDURE — 93010 ELECTROCARDIOGRAM REPORT: CPT | Performed by: INTERNAL MEDICINE

## 2021-05-07 PROCEDURE — 84484 ASSAY OF TROPONIN QUANT: CPT | Performed by: EMERGENCY MEDICINE

## 2021-05-07 PROCEDURE — 71045 X-RAY EXAM CHEST 1 VIEW: CPT

## 2021-05-07 PROCEDURE — 83690 ASSAY OF LIPASE: CPT | Performed by: EMERGENCY MEDICINE

## 2021-05-07 RX ORDER — SODIUM CHLORIDE 0.9 % (FLUSH) 0.9 %
10 SYRINGE (ML) INJECTION AS NEEDED
Status: DISCONTINUED | OUTPATIENT
Start: 2021-05-07 | End: 2021-05-07 | Stop reason: HOSPADM

## 2021-05-09 LAB — QT INTERVAL: 381 MS

## 2021-06-22 ENCOUNTER — OFFICE VISIT (OUTPATIENT)
Dept: FAMILY MEDICINE CLINIC | Facility: CLINIC | Age: 71
End: 2021-06-22

## 2021-06-22 VITALS
BODY MASS INDEX: 33.06 KG/M2 | HEART RATE: 80 BPM | SYSTOLIC BLOOD PRESSURE: 144 MMHG | DIASTOLIC BLOOD PRESSURE: 88 MMHG | OXYGEN SATURATION: 97 % | TEMPERATURE: 97.5 F | HEIGHT: 63 IN | RESPIRATION RATE: 16 BRPM | WEIGHT: 186.6 LBS

## 2021-06-22 DIAGNOSIS — E55.9 VITAMIN D DEFICIENCY: ICD-10-CM

## 2021-06-22 DIAGNOSIS — E78.2 HYPERLIPIDEMIA, MIXED: Primary | ICD-10-CM

## 2021-06-22 DIAGNOSIS — R79.89 ABNORMAL TSH: ICD-10-CM

## 2021-06-22 DIAGNOSIS — R73.01 IMPAIRED FASTING GLUCOSE: ICD-10-CM

## 2021-06-22 DIAGNOSIS — R03.0 BLOOD PRESSURE ELEVATED WITHOUT HISTORY OF HTN: ICD-10-CM

## 2021-06-22 PROBLEM — R07.9 CHEST PAIN: Status: RESOLVED | Noted: 2020-01-28 | Resolved: 2021-06-22

## 2021-06-22 PROCEDURE — 99214 OFFICE O/P EST MOD 30 MIN: CPT | Performed by: PHYSICIAN ASSISTANT

## 2021-06-22 PROCEDURE — G0439 PPPS, SUBSEQ VISIT: HCPCS | Performed by: PHYSICIAN ASSISTANT

## 2021-06-22 NOTE — PROGRESS NOTES
The ABCs of the Annual Wellness Visit  Subsequent Medicare Wellness Visit    Chief Complaint   Patient presents with   • symptoms from covid vac       Subjective   History of Present Illness:  Arcelia Cole is a 70 y.o. female who presents for a Subsequent Medicare Wellness Visit.    HEALTH RISK ASSESSMENT    Recent Hospitalizations:  Recently treated at the following:  Other: Emergency room at [facility] 5/7/2021    Current Medical Providers:  Patient Care Team:  Ana Pantoja PA-C as PCP - General (Family Medicine)    Smoking Status:  Social History     Tobacco Use   Smoking Status Never Smoker   Smokeless Tobacco Never Used       Alcohol Consumption:  Social History     Substance and Sexual Activity   Alcohol Use Yes    Comment: occasional       Depression Screen:   PHQ-2/PHQ-9 Depression Screening 6/22/2021   Little interest or pleasure in doing things 0   Feeling down, depressed, or hopeless 0   Trouble falling or staying asleep, or sleeping too much 0   Feeling tired or having little energy 0   Poor appetite or overeating 0   Feeling bad about yourself - or that you are a failure or have let yourself or your family down 0   Trouble concentrating on things, such as reading the newspaper or watching television 0   Moving or speaking so slowly that other people could have noticed. Or the opposite - being so fidgety or restless that you have been moving around a lot more than usual 0   Thoughts that you would be better off dead, or of hurting yourself in some way 0   Total Score 0       Fall Risk Screen:  STEADI Fall Risk Assessment has not been completed.    Health Habits and Functional and Cognitive Screening:  Functional & Cognitive Status 6/22/2021   Do you have difficulty preparing food and eating? No   Do you have difficulty bathing yourself, getting dressed or grooming yourself? No   Do you have difficulty using the toilet? No   Do you have difficulty moving around from place to place? No    Do you have trouble with steps or getting out of a bed or a chair? No   Current Diet Well Balanced Diet   Dental Exam Not up to date   Eye Exam Not up to date   Exercise (times per week) 7 times per week   Current Exercises Include Treadmill   Current Exercise Activities Include -   Do you need help using the phone?  No   Are you deaf or do you have serious difficulty hearing?  No   Do you need help with transportation? No   Do you need help shopping? No   Do you need help preparing meals?  No   Do you need help with housework?  No   Do you need help with laundry? No   Do you need help taking your medications? No   Do you need help managing money? No   Do you ever drive or ride in a car without wearing a seat belt? No   Have you felt unusual stress, anger or loneliness in the last month? No   Who do you live with? Spouse   If you need help, do you have trouble finding someone available to you? No   Have you been bothered in the last four weeks by sexual problems? No   Do you have difficulty concentrating, remembering or making decisions? No         Does the patient have evidence of cognitive impairment? No    Asprin use counseling:Does not need ASA (and currently is not on it)    Age-appropriate Screening Schedule:  Refer to the list below for future screening recommendations based on patient's age, sex and/or medical conditions. Orders for these recommended tests are listed in the plan section. The patient has been provided with a written plan.    Health Maintenance   Topic Date Due   • ZOSTER VACCINE (1 of 2) Never done   • LIPID PANEL  11/07/2020   • INFLUENZA VACCINE  08/01/2021   • MAMMOGRAM  12/13/2021   • DXA SCAN  12/13/2021   • TDAP/TD VACCINES (3 - Td or Tdap) 12/16/2030          The following portions of the patient's history were reviewed and updated as appropriate: allergies, current medications, past family history, past medical history, past social history, past surgical history and problem  list.    Outpatient Medications Prior to Visit   Medication Sig Dispense Refill   • acetaminophen (TYLENOL) 500 MG tablet Take 500 mg by mouth Every 6 (Six) Hours As Needed for Mild Pain .     • halobetasol (ULTRAVATE) 0.05 % cream Apply  topically to the appropriate area as directed 2 (Two) Times a Day. PRN and never on face 50 g 11   • ibuprofen (ADVIL,MOTRIN) 200 MG tablet Take 200 mg by mouth Every 6 (Six) Hours As Needed for Mild Pain .     • lidocaine (LIDODERM) 5 % Place 1 patch on the skin as directed by provider Daily. Apply to area of pain on skin 12 hours on and 12 hours off PRN 30 patch 1   • methocarbamol (Robaxin) 500 MG tablet Take 1 tablet by mouth 3 (Three) Times a Day. For spasms PRN 60 tablet 2   • methylPREDNISolone (MEDROL, NOLAN,) 4 MG tablet follow package directions 21 tablet 0   • Misc Natural Products (ESTROVEN ENERGY PO) Take  by mouth.     • Multiple Vitamins-Minerals (CENTRUM WOMEN PO) Take  by mouth.     • NON FORMULARY Formula 303     • Probiotic Product (PROBIOTIC DAILY PO) Take  by mouth.       No facility-administered medications prior to visit.       Patient Active Problem List   Diagnosis   • Psoriasis   • Hypertriglyceridemia   • Impaired fasting glucose   • Vitamin D deficiency   • Hypothyroidism, acquired   • Hyperlipidemia, mixed   • Metatarsus adductus   • Hallux valgus of right foot       Advanced Care Planning:  ACP discussion was held with the patient during this visit. Patient does not have an advance directive, information provided.    Review of Systems    Compared to one year ago, the patient feels her physical health is the same.  Compared to one year ago, the patient feels her mental health is the same.    Reviewed chart for potential of high risk medication in the elderly: yes  Reviewed chart for potential of harmful drug interactions in the elderly:yes    Objective         Vitals:    06/22/21 1130   BP: 142/81   BP Location: Right arm   Patient Position: Sitting  "  Cuff Size: Adult   Pulse: 80   Resp: 16   Temp: 97.5 °F (36.4 °C)   SpO2: 97%   Weight: 84.6 kg (186 lb 9.6 oz)   Height: 160 cm (62.99\")       Body mass index is 33.06 kg/m².  Discussed the patient's BMI with her. The BMI is above average; BMI management plan is completed.    Physical Exam          Assessment/Plan   Medicare Risks and Personalized Health Plan  CMS Preventative Services Quick Reference  Breast Cancer/Mammogram Screening  Cardiovascular risk  Obesity/Overweight     The above risks/problems have been discussed with the patient.  Pertinent information has been shared with the patient in the After Visit Summary.  Follow up plans and orders are seen below in the Assessment/Plan Section.    There are no diagnoses linked to this encounter.  Follow Up:  No follow-ups on file.     An After Visit Summary and PPPS were given to the patient.  Declines mammogram               "

## 2021-06-22 NOTE — PROGRESS NOTES
"Subjective   Arcelia Cole is a 70 y.o. female.     History of Present Illness   Arcelia Cole 70 y.o. female presents today for Emergency Room follow up.  she was treated 5-7-21 for chest pain  .  I reviewed all of the labs and diagnostic testing and noted:  CXR, EKG---see below  The patient's medications were not changed:  Current outpatient and discharge medications have been reconciled for the patient.  Reviewed by: Ana Pantoja PA-C    she does not have a follow up appointment with a specialist:      Since the last visit, she has overall felt fairly well.  She has Impaired fasting glucose and will monitor labs to watch for DMII, Hyperlipidemia and working on this with diet and exercise, Vitamin D deficiency and will update labs for continued management and Essential hypertension and patient declines my recommendation for ACE inhibitor therapy today, mixed hyperlipidemia patient wants to work on diet and exercise and declined statin.  she has been compliant with current medications have reviewed them.  The patient denies medication side effects.  Will refill medications. /81 (BP Location: Right arm, Patient Position: Sitting, Cuff Size: Adult)   Pulse 80   Temp 97.5 °F (36.4 °C)   Resp 16   Ht 160 cm (62.99\")   Wt 84.6 kg (186 lb 9.6 oz)   LMP  (LMP Unknown)   SpO2 97%   BMI 33.06 kg/m²     Results for orders placed or performed during the hospital encounter of 05/07/21   Comprehensive Metabolic Panel    Specimen: Blood   Result Value Ref Range    Glucose 156 (H) 65 - 99 mg/dL    BUN 9 8 - 23 mg/dL    Creatinine 0.74 0.57 - 1.00 mg/dL    Sodium 140 136 - 145 mmol/L    Potassium 4.1 3.5 - 5.2 mmol/L    Chloride 105 98 - 107 mmol/L    CO2 23.8 22.0 - 29.0 mmol/L    Calcium 9.0 8.6 - 10.5 mg/dL    Total Protein 7.0 6.0 - 8.5 g/dL    Albumin 4.20 3.50 - 5.20 g/dL    ALT (SGPT) 22 1 - 33 U/L    AST (SGOT) 16 1 - 32 U/L    Alkaline Phosphatase 97 39 - 117 U/L    Total Bilirubin 0.3 0.0 - 1.2 mg/dL "    eGFR Non African Amer 78 >60 mL/min/1.73    Globulin 2.8 gm/dL    A/G Ratio 1.5 g/dL    BUN/Creatinine Ratio 12.2 7.0 - 25.0    Anion Gap 11.2 5.0 - 15.0 mmol/L   Protime-INR    Specimen: Blood   Result Value Ref Range    Protime 12.4 11.7 - 14.2 Seconds    INR 0.94 0.90 - 1.10   Lipase    Specimen: Blood   Result Value Ref Range    Lipase 28 13 - 60 U/L   D-dimer, Quantitative    Specimen: Blood   Result Value Ref Range    D-Dimer, Quantitative 0.48 0.00 - 0.49 MCGFEU/mL   Troponin    Specimen: Blood   Result Value Ref Range    Troponin T <0.010 0.000 - 0.030 ng/mL   Troponin    Specimen: Blood   Result Value Ref Range    Troponin T <0.010 0.000 - 0.030 ng/mL   CBC Auto Differential    Specimen: Blood   Result Value Ref Range    WBC 8.09 3.40 - 10.80 10*3/mm3    RBC 4.98 3.77 - 5.28 10*6/mm3    Hemoglobin 15.0 12.0 - 15.9 g/dL    Hematocrit 45.1 34.0 - 46.6 %    MCV 90.6 79.0 - 97.0 fL    MCH 30.1 26.6 - 33.0 pg    MCHC 33.3 31.5 - 35.7 g/dL    RDW 13.0 12.3 - 15.4 %    RDW-SD 43.3 37.0 - 54.0 fl    MPV 11.4 6.0 - 12.0 fL    Platelets 296 140 - 450 10*3/mm3    Neutrophil % 65.7 42.7 - 76.0 %    Lymphocyte % 22.4 19.6 - 45.3 %    Monocyte % 7.3 5.0 - 12.0 %    Eosinophil % 2.8 0.3 - 6.2 %    Basophil % 1.2 0.0 - 1.5 %    Immature Grans % 0.6 (H) 0.0 - 0.5 %    Neutrophils, Absolute 5.31 1.70 - 7.00 10*3/mm3    Lymphocytes, Absolute 1.81 0.70 - 3.10 10*3/mm3    Monocytes, Absolute 0.59 0.10 - 0.90 10*3/mm3    Eosinophils, Absolute 0.23 0.00 - 0.40 10*3/mm3    Basophils, Absolute 0.10 0.00 - 0.20 10*3/mm3    Immature Grans, Absolute 0.05 0.00 - 0.05 10*3/mm3    nRBC 0.0 0.0 - 0.2 /100 WBC   POC Glucose Once    Specimen: Blood   Result Value Ref Range    Glucose 151 (H) 70 - 130 mg/dL   ECG 12 Lead   Result Value Ref Range    QT Interval 381 ms     Elevated weight  Do want a note her ER records from 5/9/2021 when she went to [facility] for atypical chest pain her EKG was read as unchanged no  significant change from previous ECG and also noted LVH by voltage  Had one-view portable chest x-ray with notes of normal cardiac size and moderate degenerative disease involving the left shoulder and mild dextroscoliosis of the thoracic spine  CBC and CMP along with troponin--- only concern was elevated glucose  D-dimer normal  The following portions of the patient's history were reviewed and updated as appropriate: allergies, current medications, past family history, past medical history, past social history, past surgical history and problem list.    Review of Systems   Constitutional: Negative for activity change, appetite change and unexpected weight change.   HENT: Negative for nosebleeds and trouble swallowing.    Eyes: Negative for pain and visual disturbance.   Respiratory: Negative for chest tightness, shortness of breath and wheezing.    Cardiovascular: Negative for chest pain and palpitations.   Gastrointestinal: Negative for abdominal pain and blood in stool.   Endocrine: Negative.    Genitourinary: Negative for difficulty urinating and hematuria.   Musculoskeletal: Negative for joint swelling.   Skin: Negative for color change and rash.   Allergic/Immunologic: Negative.    Neurological: Negative for syncope and speech difficulty.   Hematological: Negative for adenopathy.   Psychiatric/Behavioral: Negative for agitation and confusion.   All other systems reviewed and are negative.      Objective   Physical Exam  Vitals and nursing note reviewed.   Constitutional:       General: She is not in acute distress.     Appearance: She is well-developed. She is obese. She is not ill-appearing or toxic-appearing.   HENT:      Head: Normocephalic.      Right Ear: External ear normal.      Left Ear: External ear normal.      Nose: Nose normal.      Mouth/Throat:      Pharynx: Oropharynx is clear.   Eyes:      General: No scleral icterus.     Conjunctiva/sclera: Conjunctivae normal.      Pupils: Pupils are equal,  round, and reactive to light.   Neck:      Thyroid: No thyromegaly.      Vascular: No carotid bruit.   Cardiovascular:      Rate and Rhythm: Normal rate and regular rhythm.      Pulses: Normal pulses.      Heart sounds: Normal heart sounds. No murmur heard.     Pulmonary:      Effort: Pulmonary effort is normal. No respiratory distress.      Breath sounds: Normal breath sounds.   Musculoskeletal:         General: No deformity. Normal range of motion.      Cervical back: Normal range of motion and neck supple.      Right lower leg: Edema present.      Left lower leg: Edema present.      Comments: She has trace of pedal edema  Also note some minimal varicosities noted lower extremities.  Patient denies calf pain or new onset edema--negative Homans' sign no concerned about DVT   Skin:     General: Skin is warm and dry.      Findings: No rash.   Neurological:      General: No focal deficit present.      Mental Status: She is alert and oriented to person, place, and time. Mental status is at baseline.   Psychiatric:         Mood and Affect: Mood normal.         Behavior: Behavior normal.         Thought Content: Thought content normal.         Judgment: Judgment normal.         Assessment/Plan   Diagnoses and all orders for this visit:    1. Hyperlipidemia, mixed (Primary)  -     Comprehensive metabolic panel  -     Lipid panel  -     CBC and Differential  -     TSH  -     Hemoglobin A1c  -     T3, Free  -     T4, Free  -     Vitamin D 25 Hydroxy  -     Vitamin B12  -     Folate    2. Impaired fasting glucose  -     Comprehensive metabolic panel  -     Lipid panel  -     CBC and Differential  -     TSH  -     Hemoglobin A1c  -     T3, Free  -     T4, Free  -     Vitamin D 25 Hydroxy  -     Vitamin B12  -     Folate    3. Vitamin D deficiency  -     Comprehensive metabolic panel  -     Lipid panel  -     CBC and Differential  -     TSH  -     Hemoglobin A1c  -     T3, Free  -     T4, Free  -     Vitamin D 25 Hydroxy  -      Vitamin B12  -     Folate    4. Abnormal TSH  -     Comprehensive metabolic panel  -     Lipid panel  -     CBC and Differential  -     TSH  -     Hemoglobin A1c  -     T3, Free  -     T4, Free  -     Vitamin D 25 Hydroxy  -     Vitamin B12  -     Folate    5. Blood pressure elevated without history of HTN  -     Comprehensive metabolic panel  -     Lipid panel  -     CBC and Differential  -     TSH  -     Hemoglobin A1c  -     T3, Free  -     T4, Free  -     Vitamin D 25 Hydroxy  -     Vitamin B12  -     Folate        Do suggest screening mammogram  Concerned that patient has essential hypertension and declines my recommendation to start ACE inhibitor for treatment  Mixed hyperlipidemia she declines starting statin  Plan, Arcelia Cole, was seen today.  she was seen for Imparied fasting glucose and plan follow up labs, diet, and exercise and Vitamin D deficiency and will update labs .

## 2023-02-23 ENCOUNTER — OFFICE VISIT (OUTPATIENT)
Dept: FAMILY MEDICINE CLINIC | Facility: CLINIC | Age: 73
End: 2023-02-23
Payer: MEDICARE

## 2023-02-23 VITALS
RESPIRATION RATE: 16 BRPM | OXYGEN SATURATION: 100 % | TEMPERATURE: 97.8 F | SYSTOLIC BLOOD PRESSURE: 155 MMHG | WEIGHT: 177 LBS | HEIGHT: 63 IN | BODY MASS INDEX: 31.36 KG/M2 | DIASTOLIC BLOOD PRESSURE: 98 MMHG | HEART RATE: 82 BPM

## 2023-02-23 DIAGNOSIS — E55.9 VITAMIN D DEFICIENCY: ICD-10-CM

## 2023-02-23 DIAGNOSIS — I10 BENIGN ESSENTIAL HTN: Primary | ICD-10-CM

## 2023-02-23 DIAGNOSIS — R73.01 IMPAIRED FASTING GLUCOSE: ICD-10-CM

## 2023-02-23 DIAGNOSIS — Z78.0 POST-MENOPAUSAL: ICD-10-CM

## 2023-02-23 DIAGNOSIS — Z12.31 ENCOUNTER FOR SCREENING MAMMOGRAM FOR BREAST CANCER: ICD-10-CM

## 2023-02-23 DIAGNOSIS — E78.2 HYPERLIPIDEMIA, MIXED: ICD-10-CM

## 2023-02-23 PROCEDURE — 90677 PCV20 VACCINE IM: CPT | Performed by: PHYSICIAN ASSISTANT

## 2023-02-23 PROCEDURE — 99214 OFFICE O/P EST MOD 30 MIN: CPT | Performed by: PHYSICIAN ASSISTANT

## 2023-02-23 PROCEDURE — G0009 ADMIN PNEUMOCOCCAL VACCINE: HCPCS | Performed by: PHYSICIAN ASSISTANT

## 2023-02-23 RX ORDER — LISINOPRIL 20 MG/1
20 TABLET ORAL DAILY
Qty: 30 TABLET | Refills: 0 | Status: SHIPPED | OUTPATIENT
Start: 2023-02-23 | End: 2023-03-12

## 2023-02-23 NOTE — PROGRESS NOTES
"Subjective   Arcelia Cole is a 72 y.o. female.     History of Present Illness      Since the last visit, she has overall felt fairly well.  She has New diagnosis today of Primary Hypertension and will start medication, Impaired fasting glucose and will monitor labs to watch for DMII, Hyperlipidemia and will start medication plan for treatment.  I will order follow up labs and Vitamin D deficiency and will update labs for continued management.  she has been compliant with current medications have reviewed them.  The patient denies medication side effects.  Will refill medications. /88   Pulse 82   Temp 97.8 °F (36.6 °C)   Resp 16   Ht 160 cm (63\")   Wt 80.3 kg (177 lb)   LMP  (LMP Unknown)   SpO2 100%   BMI 31.35 kg/m²     Results for orders placed or performed during the hospital encounter of 05/07/21   Comprehensive Metabolic Panel    Specimen: Blood   Result Value Ref Range    Glucose 156 (H) 65 - 99 mg/dL    BUN 9 8 - 23 mg/dL    Creatinine 0.74 0.57 - 1.00 mg/dL    Sodium 140 136 - 145 mmol/L    Potassium 4.1 3.5 - 5.2 mmol/L    Chloride 105 98 - 107 mmol/L    CO2 23.8 22.0 - 29.0 mmol/L    Calcium 9.0 8.6 - 10.5 mg/dL    Total Protein 7.0 6.0 - 8.5 g/dL    Albumin 4.20 3.50 - 5.20 g/dL    ALT (SGPT) 22 1 - 33 U/L    AST (SGOT) 16 1 - 32 U/L    Alkaline Phosphatase 97 39 - 117 U/L    Total Bilirubin 0.3 0.0 - 1.2 mg/dL    eGFR Non African Amer 78 >60 mL/min/1.73    Globulin 2.8 gm/dL    A/G Ratio 1.5 g/dL    BUN/Creatinine Ratio 12.2 7.0 - 25.0    Anion Gap 11.2 5.0 - 15.0 mmol/L   Protime-INR    Specimen: Blood   Result Value Ref Range    Protime 12.4 11.7 - 14.2 Seconds    INR 0.94 0.90 - 1.10   Lipase    Specimen: Blood   Result Value Ref Range    Lipase 28 13 - 60 U/L   D-dimer, Quantitative    Specimen: Blood   Result Value Ref Range    D-Dimer, Quantitative 0.48 0.00 - 0.49 MCGFEU/mL   Troponin    Specimen: Blood   Result Value Ref Range    Troponin T <0.010 0.000 - 0.030 ng/mL   Troponin "    Specimen: Blood   Result Value Ref Range    Troponin T <0.010 0.000 - 0.030 ng/mL   CBC Auto Differential    Specimen: Blood   Result Value Ref Range    WBC 8.09 3.40 - 10.80 10*3/mm3    RBC 4.98 3.77 - 5.28 10*6/mm3    Hemoglobin 15.0 12.0 - 15.9 g/dL    Hematocrit 45.1 34.0 - 46.6 %    MCV 90.6 79.0 - 97.0 fL    MCH 30.1 26.6 - 33.0 pg    MCHC 33.3 31.5 - 35.7 g/dL    RDW 13.0 12.3 - 15.4 %    RDW-SD 43.3 37.0 - 54.0 fl    MPV 11.4 6.0 - 12.0 fL    Platelets 296 140 - 450 10*3/mm3    Neutrophil % 65.7 42.7 - 76.0 %    Lymphocyte % 22.4 19.6 - 45.3 %    Monocyte % 7.3 5.0 - 12.0 %    Eosinophil % 2.8 0.3 - 6.2 %    Basophil % 1.2 0.0 - 1.5 %    Immature Grans % 0.6 (H) 0.0 - 0.5 %    Neutrophils, Absolute 5.31 1.70 - 7.00 10*3/mm3    Lymphocytes, Absolute 1.81 0.70 - 3.10 10*3/mm3    Monocytes, Absolute 0.59 0.10 - 0.90 10*3/mm3    Eosinophils, Absolute 0.23 0.00 - 0.40 10*3/mm3    Basophils, Absolute 0.10 0.00 - 0.20 10*3/mm3    Immature Grans, Absolute 0.05 0.00 - 0.05 10*3/mm3    nRBC 0.0 0.0 - 0.2 /100 WBC   POC Glucose Once    Specimen: Blood   Result Value Ref Range    Glucose 151 (H) 70 - 130 mg/dL   ECG 12 Lead   Result Value Ref Range    QT Interval 381 ms           Do want a note her ER records from 5/9/2021 when she went to [facility] for atypical chest pain her EKG was read as unchanged no significant change from previous ECG and also noted LVH by voltage----I noted this at last visit 6-22- 2021---she refused Rx  Has seen ortho--Dr Rodriguez---OA----hx psoriasis   Went UC 12-26-22 bp high  Snores--no observed apnea  Exercises daily  No chest pain or SOA  The following portions of the patient's history were reviewed and updated as appropriate: allergies, current medications, past family history, past medical history, past social history, past surgical history and problem list.    Review of Systems   Constitutional: Negative for activity change, appetite change and unexpected weight  change.   HENT: Negative for nosebleeds and trouble swallowing.    Eyes: Negative for pain and visual disturbance.   Respiratory: Negative for chest tightness, shortness of breath and wheezing.    Cardiovascular: Negative for chest pain and palpitations.   Gastrointestinal: Negative for abdominal pain and blood in stool.   Endocrine: Negative.    Genitourinary: Negative for difficulty urinating and hematuria.   Musculoskeletal: Negative for joint swelling.   Skin: Negative for color change and rash.   Allergic/Immunologic: Negative.    Neurological: Negative for syncope and speech difficulty.   Hematological: Negative for adenopathy.   Psychiatric/Behavioral: Negative for agitation, confusion and dysphoric mood. The patient is not nervous/anxious.    All other systems reviewed and are negative.      Objective   Physical Exam  Vitals and nursing note reviewed.   Constitutional:       General: She is not in acute distress.     Appearance: She is well-developed. She is obese. She is not ill-appearing or toxic-appearing.   HENT:      Head: Normocephalic.      Right Ear: External ear normal.      Left Ear: External ear normal.      Nose: Nose normal.      Mouth/Throat:      Pharynx: Oropharynx is clear.   Eyes:      General: No scleral icterus.     Conjunctiva/sclera: Conjunctivae normal.      Pupils: Pupils are equal, round, and reactive to light.   Neck:      Thyroid: No thyromegaly.      Vascular: No carotid bruit.   Cardiovascular:      Rate and Rhythm: Normal rate and regular rhythm.      Heart sounds: Normal heart sounds. No murmur heard.  Pulmonary:      Effort: Pulmonary effort is normal. No respiratory distress.      Breath sounds: Normal breath sounds.   Musculoskeletal:         General: No deformity. Normal range of motion.      Cervical back: Normal range of motion and neck supple.      Right lower leg: No edema.      Left lower leg: No edema.   Skin:     General: Skin is warm and dry.      Findings: No rash.    Neurological:      General: No focal deficit present.      Mental Status: She is alert and oriented to person, place, and time. Mental status is at baseline.   Psychiatric:         Mood and Affect: Mood normal.         Behavior: Behavior normal.         Thought Content: Thought content normal.         Judgment: Judgment normal.         Assessment & Plan   Diagnoses and all orders for this visit:    1. Benign essential HTN (Primary)    2. Hyperlipidemia, mixed    3. Vitamin D deficiency    4. Post-menopausal  -     DEXA Bone Density Axial    5. Encounter for screening mammogram for breast cancer  -     Mammo Screening Digital Tomosynthesis Bilateral With CAD    6. Impaired fasting glucose    Other orders  -     Pneumococcal Conjugate Vaccine 20-Valent All  -     lisinopril (PRINIVIL,ZESTRIL) 20 MG tablet; Take 1 tablet by mouth Daily. For BP  Dispense: 30 tablet; Refill: 0        Plan, Arcelia Cole, was seen today.  she was seen for HTN and will start medication, Imparied fasting glucose and plan follow up labs, diet, and exercise, Hyperlipidemia and will work on this with diet and exercise and Vitamin D deficiency and will update labs .    Screen mammo and DEXA  Sees ortho  Hx psoriasis        Answers for HPI/ROS submitted by the patient on 2/22/2023  What is the primary reason for your visit?: High Blood Pressure

## 2023-02-23 NOTE — PROGRESS NOTES
Immunization  Immunization performed in Right Deltoid by Malou Vivas. Patient tolerated the procedure well without complications.  02/23/23   Malou Vivas  Answers for HPI/ROS submitted by the patient on 2/22/2023  What is the primary reason for your visit?: High Blood Pressure

## 2023-02-24 LAB
25(OH)D3+25(OH)D2 SERPL-MCNC: 53.2 NG/ML (ref 30–100)
ALBUMIN SERPL-MCNC: 4.5 G/DL (ref 3.5–5.2)
ALBUMIN/GLOB SERPL: 2 G/DL
ALP SERPL-CCNC: 106 U/L (ref 39–117)
ALT SERPL-CCNC: 27 U/L (ref 1–33)
AST SERPL-CCNC: 11 U/L (ref 1–32)
BASOPHILS # BLD AUTO: 0.07 10*3/MM3 (ref 0–0.2)
BASOPHILS NFR BLD AUTO: 0.8 % (ref 0–1.5)
BILIRUB SERPL-MCNC: 0.4 MG/DL (ref 0–1.2)
BUN SERPL-MCNC: 12 MG/DL (ref 8–23)
BUN/CREAT SERPL: 16.2 (ref 7–25)
CALCIUM SERPL-MCNC: 9.5 MG/DL (ref 8.6–10.5)
CHLORIDE SERPL-SCNC: 105 MMOL/L (ref 98–107)
CHOLEST SERPL-MCNC: 204 MG/DL (ref 0–200)
CO2 SERPL-SCNC: 24.2 MMOL/L (ref 22–29)
CREAT SERPL-MCNC: 0.74 MG/DL (ref 0.57–1)
EGFRCR SERPLBLD CKD-EPI 2021: 86.6 ML/MIN/1.73
EOSINOPHIL # BLD AUTO: 0.16 10*3/MM3 (ref 0–0.4)
EOSINOPHIL NFR BLD AUTO: 1.9 % (ref 0.3–6.2)
ERYTHROCYTE [DISTWIDTH] IN BLOOD BY AUTOMATED COUNT: 12.6 % (ref 12.3–15.4)
FOLATE SERPL-MCNC: >20 NG/ML (ref 4.78–24.2)
GLOBULIN SER CALC-MCNC: 2.2 GM/DL
GLUCOSE SERPL-MCNC: 119 MG/DL (ref 65–99)
HBA1C MFR BLD: 7.2 % (ref 4.8–5.6)
HCT VFR BLD AUTO: 41.5 % (ref 34–46.6)
HDLC SERPL-MCNC: 76 MG/DL (ref 40–60)
HGB BLD-MCNC: 14.1 G/DL (ref 12–15.9)
IMM GRANULOCYTES # BLD AUTO: 0.07 10*3/MM3 (ref 0–0.05)
IMM GRANULOCYTES NFR BLD AUTO: 0.8 % (ref 0–0.5)
LDLC SERPL CALC-MCNC: 110 MG/DL (ref 0–100)
LYMPHOCYTES # BLD AUTO: 2.36 10*3/MM3 (ref 0.7–3.1)
LYMPHOCYTES NFR BLD AUTO: 28 % (ref 19.6–45.3)
MCH RBC QN AUTO: 29.6 PG (ref 26.6–33)
MCHC RBC AUTO-ENTMCNC: 34 G/DL (ref 31.5–35.7)
MCV RBC AUTO: 87.2 FL (ref 79–97)
MONOCYTES # BLD AUTO: 0.6 10*3/MM3 (ref 0.1–0.9)
MONOCYTES NFR BLD AUTO: 7.1 % (ref 5–12)
NEUTROPHILS # BLD AUTO: 5.17 10*3/MM3 (ref 1.7–7)
NEUTROPHILS NFR BLD AUTO: 61.4 % (ref 42.7–76)
NRBC BLD AUTO-RTO: 0 /100 WBC (ref 0–0.2)
PLATELET # BLD AUTO: 314 10*3/MM3 (ref 140–450)
POTASSIUM SERPL-SCNC: 4.4 MMOL/L (ref 3.5–5.2)
PROT SERPL-MCNC: 6.7 G/DL (ref 6–8.5)
RBC # BLD AUTO: 4.76 10*6/MM3 (ref 3.77–5.28)
SODIUM SERPL-SCNC: 142 MMOL/L (ref 136–145)
T3FREE SERPL-MCNC: 2.9 PG/ML (ref 2–4.4)
T4 FREE SERPL-MCNC: 1 NG/DL (ref 0.93–1.7)
TRIGL SERPL-MCNC: 105 MG/DL (ref 0–150)
TSH SERPL DL<=0.005 MIU/L-ACNC: 5.21 UIU/ML (ref 0.27–4.2)
VIT B12 SERPL-MCNC: 708 PG/ML (ref 211–946)
VLDLC SERPL CALC-MCNC: 18 MG/DL (ref 5–40)
WBC # BLD AUTO: 8.43 10*3/MM3 (ref 3.4–10.8)

## 2023-03-12 RX ORDER — LISINOPRIL 20 MG/1
TABLET ORAL
Qty: 30 TABLET | Refills: 0 | Status: SHIPPED | OUTPATIENT
Start: 2023-03-12

## 2023-03-21 ENCOUNTER — OFFICE VISIT (OUTPATIENT)
Dept: FAMILY MEDICINE CLINIC | Facility: CLINIC | Age: 73
End: 2023-03-21
Payer: MEDICARE

## 2023-03-21 VITALS
RESPIRATION RATE: 16 BRPM | TEMPERATURE: 97.6 F | SYSTOLIC BLOOD PRESSURE: 126 MMHG | BODY MASS INDEX: 31.18 KG/M2 | HEART RATE: 72 BPM | WEIGHT: 176 LBS | OXYGEN SATURATION: 98 % | DIASTOLIC BLOOD PRESSURE: 76 MMHG | HEIGHT: 63 IN

## 2023-03-21 DIAGNOSIS — E11.9 TYPE 2 DIABETES MELLITUS WITHOUT COMPLICATION, WITHOUT LONG-TERM CURRENT USE OF INSULIN: Primary | ICD-10-CM

## 2023-03-21 DIAGNOSIS — E78.2 HYPERLIPIDEMIA, MIXED: ICD-10-CM

## 2023-03-21 DIAGNOSIS — E55.9 VITAMIN D DEFICIENCY: ICD-10-CM

## 2023-03-21 DIAGNOSIS — I10 BENIGN ESSENTIAL HTN: ICD-10-CM

## 2023-03-21 DIAGNOSIS — R94.6 BORDERLINE ABNORMAL THYROID FUNCTION TEST: ICD-10-CM

## 2023-03-21 RX ORDER — METFORMIN HYDROCHLORIDE 500 MG/1
TABLET, EXTENDED RELEASE ORAL
Qty: 120 TABLET | Refills: 5 | Status: SHIPPED | OUTPATIENT
Start: 2023-03-21

## 2023-03-21 NOTE — PATIENT INSTRUCTIONS
Diabetes Mellitus and Nutrition, Adult  When you have diabetes, or diabetes mellitus, it is very important to have healthy eating habits because your blood sugar (glucose) levels are greatly affected by what you eat and drink. Eating healthy foods in the right amounts, at about the same times every day, can help you:  Manage your blood glucose.  Lower your risk of heart disease.  Improve your blood pressure.  Reach or maintain a healthy weight.  What can affect my meal plan?  Every person with diabetes is different, and each person has different needs for a meal plan. Your health care provider may recommend that you work with a dietitian to make a meal plan that is best for you. Your meal plan may vary depending on factors such as:  The calories you need.  The medicines you take.  Your weight.  Your blood glucose, blood pressure, and cholesterol levels.  Your activity level.  Other health conditions you have, such as heart or kidney disease.  How do carbohydrates affect me?  Carbohydrates, also called carbs, affect your blood glucose level more than any other type of food. Eating carbs raises the amount of glucose in your blood.  It is important to know how many carbs you can safely have in each meal. This is different for every person. Your dietitian can help you calculate how many carbs you should have at each meal and for each snack.  How does alcohol affect me?  Alcohol can cause a decrease in blood glucose (hypoglycemia), especially if you use insulin or take certain diabetes medicines by mouth. Hypoglycemia can be a life-threatening condition. Symptoms of hypoglycemia, such as sleepiness, dizziness, and confusion, are similar to symptoms of having too much alcohol.  Do not drink alcohol if:  Your health care provider tells you not to drink.  You are pregnant, may be pregnant, or are planning to become pregnant.  If you drink alcohol:  Limit how much you have to:  0-1 drink a day for women.  0-2 drinks a day  "for men.  Know how much alcohol is in your drink. In the [place] one drink equals one 12 oz bottle of beer (355 mL), one 5 oz glass of wine (148 mL), or one 1½ oz glass of hard liquor (44 mL).  Keep yourself hydrated with water, diet soda, or unsweetened iced tea. Keep in mind that regular soda, juice, and other mixers may contain a lot of sugar and must be counted as carbs.  What are tips for following this plan?  Reading food labels  Start by checking the serving size on the Nutrition Facts label of packaged foods and drinks. The number of calories and the amount of carbs, fats, and other nutrients listed on the label are based on one serving of the item. Many items contain more than one serving per package.  Check the total grams (g) of carbs in one serving.  Check the number of grams of saturated fats and trans fats in one serving. Choose foods that have a low amount or none of these fats.  Check the number of milligrams (mg) of salt (sodium) in one serving. Most people should limit total sodium intake to less than 2,300 mg per day.  Always check the nutrition information of foods labeled as \"low-fat\" or \"nonfat.\" These foods may be higher in added sugar or refined carbs and should be avoided.  Talk to your dietitian to identify your daily goals for nutrients listed on the label.  Shopping  Avoid buying canned, pre-made, or processed foods. These foods tend to be high in fat, sodium, and added sugar.  Shop around the outside edge of the grocery store. This is where you will most often find fresh fruits and vegetables, bulk grains, fresh meats, and fresh dairy products.  Cooking  Use low-heat cooking methods, such as baking, instead of high-heat cooking methods, such as deep frying.  Cook using healthy oils, such as olive, canola, or sunflower oil.  Avoid cooking with butter, cream, or high-fat meats.  Meal planning  Eat meals and snacks regularly, preferably at the same times every day. Avoid going long periods of " time without eating.  Eat foods that are high in fiber, such as fresh fruits, vegetables, beans, and whole grains.  Eat 4-6 oz (112-168 g) of lean protein each day, such as lean meat, chicken, fish, eggs, or tofu. One ounce (oz) (28 g) of lean protein is equal to:  1 oz (28 g) of meat, chicken, or fish.  1 egg.  ¼ cup (62 g) of tofu.  Eat some foods each day that contain healthy fats, such as avocado, nuts, seeds, and fish.  What foods should I eat?  Fruits  Berries. Apples. Oranges. Peaches. Apricots. Plums. Grapes. Mangoes. Papayas. Pomegranates. Kiwi. Cherries.  Vegetables  Leafy greens, including lettuce, spinach, kale, chard, darrell greens, mustard greens, and cabbage. Beets. Cauliflower. Broccoli. Carrots. Green beans. Tomatoes. Peppers. Onions. Cucumbers. [place] sprouts.  Grains  Whole grains, such as whole-wheat or whole-grain bread, crackers, tortillas, cereal, and pasta. Unsweetened oatmeal. Quinoa. Brown or wild rice.  Meats and other proteins  Seafood. Poultry without skin. Lean cuts of poultry and beef. Tofu. Nuts. Seeds.  Dairy  Low-fat or fat-free dairy products such as milk, yogurt, and cheese.  The items listed above may not be a complete list of foods and beverages you can eat and drink. Contact a dietitian for more information.  What foods should I avoid?  Fruits  Fruits canned with syrup.  Vegetables  Canned vegetables. Frozen vegetables with butter or cream sauce.  Grains  Refined white flour and flour products such as bread, pasta, snack foods, and cereals. Avoid all processed foods.  Meats and other proteins  Fatty cuts of meat. Poultry with skin. Breaded or fried meats. Processed meat. Avoid saturated fats.  Dairy  Full-fat yogurt, cheese, or milk.  Beverages  Sweetened drinks, such as soda or iced tea.  The items listed above may not be a complete list of foods and beverages you should avoid. Contact a dietitian for more information.  Questions to ask a health care provider  Do I need to  meet with a certified diabetes care and ?  Do I need to meet with a dietitian?  What number can I call if I have questions?  When are the best times to check my blood glucose?  Where to find more information:  American Diabetes Association: diabetes.org  Academy of Nutrition and Dietetics: eatright.org  National [place] of Diabetes and Digestive and Kidney Diseases: niddk.nih.gov  Association of Diabetes Care & Education Specialists: diabeteseducator.org  Summary  It is important to have healthy eating habits because your blood sugar (glucose) levels are greatly affected by what you eat and drink. It is important to use alcohol carefully.  A healthy meal plan will help you manage your blood glucose and lower your risk of heart disease.  Your health care provider may recommend that you work with a dietitian to make a meal plan that is best for you.  This information is not intended to replace advice given to you by your health care provider. Make sure you discuss any questions you have with your health care provider.  Document Revised: 07/21/2021 Document Reviewed: 07/21/2021  Elsevier Patient Education © 2022 Elsevier Inc.

## 2023-03-21 NOTE — PROGRESS NOTES
"Subjective   Arcelia Cole is a 72 y.o. female.     History of Present Illness    Since the last visit, she has overall felt fairly well.  She has Primary Hypertension and well controlled on current medication, New diagnosis of DMII and plan to start medication with diet and exercise. Went over need to have yearly DM eye exam and copy me on this, suggest diabetes educator and dietician.  I have reviewed lab goals, Hyperlipidemia and patient declines my medical recommendation of medication management, Vitamin D deficiency and labs are at goal >30 ng/mL and Borderline thyroid labs we will continue to monitor and plan follow-up labs 6 months.  she has been compliant with current medications have reviewed them.  The patient denies medication side effects.  Will refill medications. /76   Pulse 72   Temp 97.6 °F (36.4 °C)   Resp 16   Ht 160 cm (63\")   Wt 79.8 kg (176 lb)   LMP  (LMP Unknown)   SpO2 98%   BMI 31.18 kg/m²     Results for orders placed or performed in visit on 02/23/23   Comprehensive Metabolic Panel   Result Value Ref Range    Glucose 119 (H) 65 - 99 mg/dL    BUN 12 8 - 23 mg/dL    Creatinine 0.74 0.57 - 1.00 mg/dL    EGFR Result 86.6 >60.0 mL/min/1.73    BUN/Creatinine Ratio 16.2 7.0 - 25.0    Sodium 142 136 - 145 mmol/L    Potassium 4.4 3.5 - 5.2 mmol/L    Chloride 105 98 - 107 mmol/L    Total CO2 24.2 22.0 - 29.0 mmol/L    Calcium 9.5 8.6 - 10.5 mg/dL    Total Protein 6.7 6.0 - 8.5 g/dL    Albumin 4.5 3.5 - 5.2 g/dL    Globulin 2.2 gm/dL    A/G Ratio 2.0 g/dL    Total Bilirubin 0.4 0.0 - 1.2 mg/dL    Alkaline Phosphatase 106 39 - 117 U/L    AST (SGOT) 11 1 - 32 U/L    ALT (SGPT) 27 1 - 33 U/L   Lipid Panel   Result Value Ref Range    Total Cholesterol 204 (H) 0 - 200 mg/dL    Triglycerides 105 0 - 150 mg/dL    HDL Cholesterol 76 (H) 40 - 60 mg/dL    VLDL Cholesterol [place] 18 5 - 40 mg/dL    LDL Chol Calc (NIH) 110 (H) 0 - 100 mg/dL   Hemoglobin A1c   Result Value Ref Range    Hemoglobin " A1C 7.20 (H) 4.80 - 5.60 %   T4, Free   Result Value Ref Range    Free T4 1.00 0.93 - 1.70 ng/dL   Folate   Result Value Ref Range    Folate >20.00 4.78 - 24.20 ng/mL   TSH   Result Value Ref Range    TSH 5.210 (H) 0.270 - 4.200 uIU/mL   Vitamin D,25-Hydroxy   Result Value Ref Range    25 Hydroxy, Vitamin D 53.2 30.0 - 100.0 ng/ml   Vitamin B12   Result Value Ref Range    Vitamin B-12 708 211 - 946 pg/mL   T3, Free   Result Value Ref Range    T3, Free 2.9 2.0 - 4.4 pg/mL   CBC & Differential   Result Value Ref Range    WBC 8.43 3.40 - 10.80 10*3/mm3    RBC 4.76 3.77 - 5.28 10*6/mm3    Hemoglobin 14.1 12.0 - 15.9 g/dL    Hematocrit 41.5 34.0 - 46.6 %    MCV 87.2 79.0 - 97.0 fL    MCH 29.6 26.6 - 33.0 pg    MCHC 34.0 31.5 - 35.7 g/dL    RDW 12.6 12.3 - 15.4 %    Platelets 314 140 - 450 10*3/mm3    Neutrophil Rel % 61.4 42.7 - 76.0 %    Lymphocyte Rel % 28.0 19.6 - 45.3 %    Monocyte Rel % 7.1 5.0 - 12.0 %    Eosinophil Rel % 1.9 0.3 - 6.2 %    Basophil Rel % 0.8 0.0 - 1.5 %    Neutrophils Absolute 5.17 1.70 - 7.00 10*3/mm3    Lymphocytes Absolute 2.36 0.70 - 3.10 10*3/mm3    Monocytes Absolute 0.60 0.10 - 0.90 10*3/mm3    Eosinophils Absolute 0.16 0.00 - 0.40 10*3/mm3    Basophils Absolute 0.07 0.00 - 0.20 10*3/mm3    Immature Granulocyte Rel % 0.8 (H) 0.0 - 0.5 %    Immature Grans Absolute 0.07 (H) 0.00 - 0.05 10*3/mm3    nRBC 0.0 0.0 - 0.2 /100 WBC   The 10-year ASCVD risk score (Bhavin DK, et al., 2019) is: 13%    Values used to calculate the score:      Age: 72 years      Sex: Female      Is Non- : No      Diabetic: No      Tobacco smoker: No      Systolic Blood Pressure: 126 mmHg      Is BP treated: Yes      HDL Cholesterol: 76 mg/dL      Total Cholesterol: 204 mg/dL    Home bp 109/70----126/76  No cough    Do want a note her ER records from 5/9/2021 when she went to [facility] for atypical chest pain her EKG was read as unchanged no significant change from previous ECG and  also noted LVH by voltage  The following portions of the patient's history were reviewed and updated as appropriate: allergies, current medications, past family history, past medical history, past social history, past surgical history and problem list.    Review of Systems   Constitutional: Negative for activity change, appetite change and unexpected weight change.   HENT: Negative for nosebleeds and trouble swallowing.    Eyes: Negative for pain and visual disturbance.   Respiratory: Negative for chest tightness, shortness of breath and wheezing.    Cardiovascular: Negative for chest pain and palpitations.   Gastrointestinal: Negative for abdominal pain and blood in stool.   Endocrine: Negative.    Genitourinary: Negative for difficulty urinating and hematuria.   Musculoskeletal: Negative for joint swelling.   Skin: Negative for color change and rash.   Allergic/Immunologic: Negative.    Neurological: Negative for syncope and speech difficulty.   Hematological: Negative for adenopathy.   Psychiatric/Behavioral: Negative for agitation and confusion.   All other systems reviewed and are negative.      Objective   Physical Exam  Vitals and nursing note reviewed.   Constitutional:       General: She is not in acute distress.     Appearance: She is well-developed. She is not ill-appearing or toxic-appearing.   HENT:      Head: Normocephalic.      Right Ear: External ear normal.      Left Ear: External ear normal.      Nose: Nose normal.      Mouth/Throat:      Pharynx: Oropharynx is clear.   Eyes:      General: No scleral icterus.     Conjunctiva/sclera: Conjunctivae normal.      Pupils: Pupils are equal, round, and reactive to light.   Neck:      Thyroid: No thyromegaly.      Vascular: No carotid bruit.   Cardiovascular:      Rate and Rhythm: Normal rate and regular rhythm.      Pulses: Normal pulses.      Heart sounds: Normal heart sounds. No murmur heard.  Pulmonary:      Effort: Pulmonary effort is normal. No  respiratory distress.      Breath sounds: Normal breath sounds. No rales.   Musculoskeletal:         General: No deformity. Normal range of motion.      Cervical back: Normal range of motion and neck supple.      Right lower leg: No edema.      Left lower leg: No edema.   Skin:     General: Skin is warm and dry.      Findings: No rash.   Neurological:      General: No focal deficit present.      Mental Status: She is alert and oriented to person, place, and time. Mental status is at baseline.   Psychiatric:         Mood and Affect: Mood normal.         Behavior: Behavior normal.         Thought Content: Thought content normal.         Judgment: Judgment normal.         Assessment & Plan   Diagnoses and all orders for this visit:    1. Type 2 diabetes mellitus without complication, without long-term current use of insulin (HCC) (Primary)  -     Cancel: Hemoglobin A1c; Future  -     Cancel: Comprehensive Metabolic Panel; Future  -     Comprehensive Metabolic Panel; Future  -     Hemoglobin A1c; Future  -     Microalbumin / Creatinine Urine Ratio - Urine, Clean Catch; Future    2. Hyperlipidemia, mixed  -     Cancel: Hemoglobin A1c; Future  -     Cancel: Comprehensive Metabolic Panel; Future  -     Comprehensive Metabolic Panel; Future  -     Hemoglobin A1c; Future  -     Microalbumin / Creatinine Urine Ratio - Urine, Clean Catch; Future    3. Vitamin D deficiency  -     Cancel: Hemoglobin A1c; Future  -     Cancel: Comprehensive Metabolic Panel; Future  -     Comprehensive Metabolic Panel; Future  -     Hemoglobin A1c; Future  -     Microalbumin / Creatinine Urine Ratio - Urine, Clean Catch; Future    4. Borderline abnormal thyroid function test  -     Cancel: Hemoglobin A1c; Future  -     Cancel: Comprehensive Metabolic Panel; Future    5. Benign essential HTN  -     Cancel: Hemoglobin A1c; Future  -     Cancel: Comprehensive Metabolic Panel; Future  -     Comprehensive Metabolic Panel; Future  -     Hemoglobin A1c;  Future  -     Microalbumin / Creatinine Urine Ratio - Urine, Clean Catch; Future    Other orders  -     metFORMIN ER (GLUCOPHAGE-XR) 500 MG 24 hr tablet; 1 PO daily with food for DMII after 1 week increase to 2 p.o. daily x1 week if GI tolerant increase to 3 p.o. daily x1 week and then max dose 4 p.o. daily with food  Dispense: 120 tablet; Refill: 5      Did order mammo and DEXA  We discussed starting a statin because of her diabetes diagnosis and she wants to get started on the metformin and get follow-up labs and then reconsider after glucose is controlled  Plan, Arcelia Cole, was seen today.  she was seen for HTN and continue medication, DMII and will start medication and plan, Hyperlipidemia and decline my recommendation of medication for treatment and Vitamin D deficiency and supplemented.  Discussed metformin mechanism of action and how to slowly increase dose and do not go up on dose if having diarrhea.  Hold metformin if dehydrated or have CT with contrast  Let me know if you want to be referred for diabetes education and dietitian  Borderline thyroid and recheck 6mos  Eye exam       Answers for HPI/ROS submitted by the patient on 3/16/2023  What is the primary reason for your visit?: Physical

## 2023-04-14 ENCOUNTER — HOSPITAL ENCOUNTER (OUTPATIENT)
Dept: MAMMOGRAPHY | Facility: HOSPITAL | Age: 73
Discharge: HOME | End: 2023-04-14
Payer: MEDICARE

## 2023-04-14 ENCOUNTER — HOSPITAL ENCOUNTER (OUTPATIENT)
Dept: BONE DENSITY | Facility: HOSPITAL | Age: 73
Discharge: HOME | End: 2023-04-14
Payer: MEDICARE

## 2023-04-14 PROCEDURE — 77067 SCR MAMMO BI INCL CAD: CPT

## 2023-04-14 PROCEDURE — 77063 BREAST TOMOSYNTHESIS BI: CPT

## 2023-04-14 PROCEDURE — 77080 DXA BONE DENSITY AXIAL: CPT

## 2023-05-14 RX ORDER — LISINOPRIL 20 MG/1
TABLET ORAL
Qty: 30 TABLET | Refills: 0 | Status: SHIPPED | OUTPATIENT
Start: 2023-05-14

## 2023-05-19 NOTE — PROGRESS NOTES
The ABCs of the Annual Wellness Visit  Subsequent Medicare Wellness Visit    Subjective    Arcelia Cole is a 72 y.o. female who presents for a Subsequent Medicare Wellness Visit.    She normally sees Ana Pantoja, was scheduled with me for AWV 5/16 but did not show.    The following portions of the patient's history were reviewed and   updated as appropriate: allergies, current medications, past family history, past medical history, past social history, past surgical history and problem list.    Compared to one year ago, the patient feels her physical   health is the same.    Compared to one year ago, the patient feels her mental   health is the same.    Recent Hospitalizations:  She was not admitted to the hospital during the last year.       Current Medical Providers:  Patient Care Team:  Ana Pantoja PA-C as PCP - General (Family Medicine)  Dolly Rodriguez MD as Consulting Physician (Orthopedic Surgery)  Rip Bowie MD as Consulting Physician (Gastroenterology)  Sandy Bess MD as Consulting Physician (Obstetrics and Gynecology)    Outpatient Medications Prior to Visit   Medication Sig Dispense Refill   • halobetasol (ULTRAVATE) 0.05 % cream Apply  topically to the appropriate area as directed 2 (Two) Times a Day. PRN and never on face 50 g 11   • lisinopril (PRINIVIL,ZESTRIL) 20 MG tablet TAKE ONE TABLET BY MOUTH DAILY FOR BLOOD PRESSURE 30 tablet 0   • metFORMIN ER (GLUCOPHAGE-XR) 500 MG 24 hr tablet 1 PO daily with food for DMII after 1 week increase to 2 p.o. daily x1 week if GI tolerant increase to 3 p.o. daily x1 week and then max dose 4 p.o. daily with food 120 tablet 5   • Misc Natural Products (ESTROVEN ENERGY PO) Take  by mouth.     • Multiple Vitamins-Minerals (CENTRUM WOMEN PO) Take  by mouth.     • NON FORMULARY Formula 303     • acetaminophen (TYLENOL) 500 MG tablet Take 500 mg by mouth Every 6 (Six) Hours As Needed for Mild Pain .       No facility-administered medications prior  "to visit.       No opioid medication identified on active medication list. I have reviewed chart for other potential  high risk medication/s and harmful drug interactions in the elderly.        Aspirin is not on active medication list.  Patient takes 81 mg daily.    Patient Active Problem List   Diagnosis   • Psoriasis   • Hypertriglyceridemia   • Impaired fasting glucose   • Vitamin D deficiency   • Hypothyroidism, acquired   • Hyperlipidemia, mixed   • Metatarsus adductus   • Hallux valgus of right foot   • Borderline abnormal thyroid function test   • Type 2 diabetes mellitus without complication, without long-term current use of insulin   • Primary hypertension     Advance Care Planning   Advance Care Planning     Advance Directive is not on file.  ACP discussion was held with the patient during this visit. Patient does not have an advance directive, information provided. Pt has very specific wishes re her care. States she would want to be on ventilator x 45 days and if no hope after that point, would want to be removed from life support. Wants spouse and children to make medical decisions for her. Recommended she put all this in writing so can be scanned into chart.      Objective    Vitals:    05/26/23 1310   BP: 130/84   Pulse: 79   SpO2: 98%   Weight: 78.9 kg (174 lb)   Height: 160 cm (62.99\")     Estimated body mass index is 30.83 kg/m² as calculated from the following:    Height as of this encounter: 160 cm (62.99\").    Weight as of this encounter: 78.9 kg (174 lb).    BMI is >= 30 and <35. (Class 1 Obesity). The following options were offered after discussion;: Pt actively working on improving diet    Does the patient have evidence of cognitive impairment? No        HEALTH RISK ASSESSMENT    Smoking Status:  Social History     Tobacco Use   Smoking Status Never   Smokeless Tobacco Never     Alcohol Consumption:  Social History     Substance and Sexual Activity   Alcohol Use Yes   • Alcohol/week: 1.0 " standard drink   • Types: 1 Glasses of wine per week    Comment: occasional     Fall Risk Screen:    CONCETTA Fall Risk Assessment was completed, and patient is at LOW risk for falls.Assessment completed on:2023    Depression Screenin/26/2023     1:00 PM   PHQ-2/PHQ-9 Depression Screening   Little Interest or Pleasure in Doing Things 0-->not at all   Feeling Down, Depressed or Hopeless 0-->not at all   Trouble Falling or Staying Asleep, or Sleeping Too Much 0-->not at all   Feeling Tired or Having Little Energy 3-->nearly every day   Poor Appetite or Overeating 0-->not at all   Feeling Bad about Yourself - or that You are a Failure or Have Let Yourself or Your Family Down 0-->not at all   Trouble Concentrating on Things, Such as Reading the Newspaper or Watching Television 0-->not at all   Moving or Speaking So Slowly that Other People Could Have Noticed? Or the Opposite - Being So Fidgety 1-->several days   Thoughts that You Would be Better Off Dead or of Hurting Yourself in Some Way 0-->not at all   PHQ-9: Brief Depression Severity Measure Score 4   If You Checked Off Any Problems, How Difficult Have These Problems Made It For You to Do Your Work, Take Care of Things at Home, or Get Along with Other People? not difficult at all       Health Habits and Functional and Cognitive Screenin/26/2023     1:00 PM   Functional & Cognitive Status   Do you have difficulty preparing food and eating? No   Do you have difficulty bathing yourself, getting dressed or grooming yourself? No   Do you have difficulty using the toilet? No   Do you have difficulty moving around from place to place? No   Do you have trouble with steps or getting out of a bed or a chair? No   Current Diet Well Balanced Diet   Dental Exam Not up to date   Eye Exam Up to date   Exercise (times per week) 7 times per week   Current Exercises Include Walking;Light Weights;Cardiovascular Workout   Do you need help using the phone?  No   Are  you deaf or do you have serious difficulty hearing?  No   Do you need help with transportation? No   Do you need help shopping? No   Do you need help preparing meals?  No   Do you need help with housework?  No   Do you need help with laundry? No   Do you need help taking your medications? No   Do you need help managing money? No   Do you ever drive or ride in a car without wearing a seat belt? No   Have you felt unusual stress, anger or loneliness in the last month? No   Who do you live with? Spouse   If you need help, do you have trouble finding someone available to you? No   Have you been bothered in the last four weeks by sexual problems? No   Do you have difficulty concentrating, remembering or making decisions? No       Age-appropriate Screening Schedule:  Refer to the list below for future screening recommendations based on patient's age, sex and/or medical conditions. Orders for these recommended tests are listed in the plan section. The patient has been provided with a written plan.    Health Maintenance   Topic Date Due   • URINE MICROALBUMIN  Never done   • ZOSTER VACCINE (1 of 2) Never done   • DIABETIC EYE EXAM  Never done   • COVID-19 Vaccine (3 - Booster for Moderna series) 07/15/2021   • INFLUENZA VACCINE  08/01/2023   • HEMOGLOBIN A1C  08/23/2023   • LIPID PANEL  02/23/2024   • ANNUAL WELLNESS VISIT  05/26/2024   • DIABETIC FOOT EXAM  05/26/2024   • MAMMOGRAM  04/14/2025   • DXA SCAN  04/14/2025   • COLORECTAL CANCER SCREENING  02/07/2030   • TDAP/TD VACCINES (3 - Td or Tdap) 12/16/2030   • Pneumococcal Vaccine 65+  Completed   • HEPATITIS C SCREENING  Discontinued                  CMS Preventative Services Quick Reference  Risk Factors Identified During Encounter  Vision Screening Recommended  The above risks/problems have been discussed with the patient.  Pertinent information has been shared with the patient in the After Visit Summary.  An After Visit Summary and PPPS were made available to the  "patient.    Follow Up:   Next Medicare Wellness visit to be scheduled in 1 year.       Additional E&M Note during same encounter follows:  Patient has multiple medical problems which are significant and separately identifiable that require additional work above and beyond the Medicare Wellness Visit.      Chief Complaint  Medicare Wellness-subsequent    Subjective        HPI  Arcelia Cole is also being seen today for diabetes. Diagnosed with DM2 in February by Ana Pantoja, A1c 7.2. Ana recommended Metformin and eye exam. She also recommended statin therapy but pt preferred to avoid.    At some point in April, pt states she got dizzy and fell after taking 3 tabs of Metformin (or 2) tabs daily., Pt cannot recall exact date. Did not pass out. She states she has hx of low blood sugars but did not check BG with this episode. She reports she is working on her diet. Does not check blood sugar regularly but  this am. Has done \"[employer] 3 day diet\" a couple times since last visit with Ana. Last eye exam was two years ago, goes to [facility]. Concerned she may have neuropathy, notices sometimes toes feel a little funny.    Started on Lisinopril for BP, tolerating well    Objective   Vital Signs:  /84   Pulse 79   Ht 160 cm (62.99\")   Wt 78.9 kg (174 lb)   SpO2 98%   BMI 30.83 kg/m²     Physical Exam  Constitutional:       General: She is not in acute distress.     Appearance: Normal appearance.   HENT:      Head: Normocephalic and atraumatic.   Eyes:      Conjunctiva/sclera: Conjunctivae normal.   Cardiovascular:      Rate and Rhythm: Normal rate.      Pulses:           Dorsalis pedis pulses are 2+ on the right side and 2+ on the left side.        Posterior tibial pulses are 2+ on the right side and 2+ on the left side.   Pulmonary:      Effort: Pulmonary effort is normal.   Musculoskeletal:         General: No swelling or deformity.   Feet:      Right foot:      Protective Sensation: 8 sites tested. 8 sites " sensed.      Skin integrity: Callus present.      Left foot:      Protective Sensation: 8 sites tested. 8 sites sensed.      Skin integrity: Callus present.   Skin:     Coloration: Skin is not jaundiced.      Findings: No rash.   Neurological:      General: No focal deficit present.      Mental Status: She is alert and oriented to person, place, and time.   Psychiatric:         Mood and Affect: Mood normal.         Behavior: Behavior normal.         Judgment: Judgment normal.          The following data was reviewed by: Jennifer Beatty MD on 05/26/2023:          Office Visit with Ana Pantoja PA-C (03/21/2023)  Hemoglobin A1c (02/23/2023 14:33)  Lipid Panel (02/23/2023 14:33)  Comprehensive Metabolic Panel (02/23/2023 14:33)  CBC & Differential (02/23/2023 14:33)     Assessment and Plan   Diagnoses and all orders for this visit:    1. Encounter for subsequent annual wellness visit (AWV) in Medicare patient (Primary)    2. Type 2 diabetes mellitus without complication, without long-term current use of insulin  Assessment & Plan:  A1c: today  Dilated Eye Exam: Strongly recommended that pt make an appt  Urine microalbumin: Today  On ACE/ARB? Yes  Statin: pt declines  Pneumococcal vaccination: UTD  Counseled patient on checking feet daily for sores and ulcers, and to report any abnormalities to a provider.    ?? Hypoglycemia episodes on Metformin. Recommended pt check blood sugar daily and bring log to next visit. Check A1c. May need to decrease dose of Metformin, depending on BG trend and A1c. F/u results.       Orders:  -     Hemoglobin A1c  -     Microalbumin / Creatinine Urine Ratio - Urine, Clean Catch  -     Basic metabolic panel    3. Primary hypertension  Assessment & Plan:  Con't Lisinopril, check BMP      4. Hyperlipidemia, mixed  Assessment & Plan:  Has declined statin therapy      Other orders  -     aspirin 81 MG EC tablet; Take 1 tablet by mouth Every Night for 30 days.  Dispense: 30 tablet; Refill:  0           Follow Up   Return in about 3 months (around 8/26/2023) for Recheck, Next scheduled follow up.  Patient was given instructions and counseling regarding her condition or for health maintenance advice. Please see specific information pulled into the AVS if appropriate.

## 2023-05-26 ENCOUNTER — OFFICE VISIT (OUTPATIENT)
Dept: FAMILY MEDICINE CLINIC | Facility: CLINIC | Age: 73
End: 2023-05-26
Payer: MEDICARE

## 2023-05-26 VITALS
SYSTOLIC BLOOD PRESSURE: 130 MMHG | HEART RATE: 79 BPM | OXYGEN SATURATION: 98 % | HEIGHT: 63 IN | BODY MASS INDEX: 30.83 KG/M2 | DIASTOLIC BLOOD PRESSURE: 84 MMHG | WEIGHT: 174 LBS

## 2023-05-26 DIAGNOSIS — Z00.00 ENCOUNTER FOR SUBSEQUENT ANNUAL WELLNESS VISIT (AWV) IN MEDICARE PATIENT: Primary | ICD-10-CM

## 2023-05-26 DIAGNOSIS — I10 PRIMARY HYPERTENSION: ICD-10-CM

## 2023-05-26 DIAGNOSIS — E78.2 HYPERLIPIDEMIA, MIXED: ICD-10-CM

## 2023-05-26 DIAGNOSIS — E11.9 TYPE 2 DIABETES MELLITUS WITHOUT COMPLICATION, WITHOUT LONG-TERM CURRENT USE OF INSULIN: ICD-10-CM

## 2023-05-26 RX ORDER — ASPIRIN 81 MG/1
81 TABLET ORAL NIGHTLY
Qty: 30 TABLET | Refills: 0
Start: 2023-05-26 | End: 2023-06-25

## 2023-05-26 NOTE — ASSESSMENT & PLAN NOTE
A1c: today  Dilated Eye Exam: Strongly recommended that pt make an appt  Urine microalbumin: Today  On ACE/ARB? Yes  Statin: pt declines  Pneumococcal vaccination: UTD  Counseled patient on checking feet daily for sores and ulcers, and to report any abnormalities to a provider.    ?? Hypoglycemia episodes on Metformin. Recommended pt check blood sugar daily and bring log to next visit. Check A1c. May need to decrease dose of Metformin, depending on BG trend and A1c. F/u results.

## 2023-05-27 LAB
ALBUMIN/CREAT UR: <9 MG/G CREAT (ref 0–29)
BUN SERPL-MCNC: 14 MG/DL (ref 8–23)
BUN/CREAT SERPL: 21.5 (ref 7–25)
CALCIUM SERPL-MCNC: 10.2 MG/DL (ref 8.6–10.5)
CHLORIDE SERPL-SCNC: 103 MMOL/L (ref 98–107)
CO2 SERPL-SCNC: 22.7 MMOL/L (ref 22–29)
CREAT SERPL-MCNC: 0.65 MG/DL (ref 0.57–1)
CREAT UR-MCNC: 32.9 MG/DL
EGFRCR SERPLBLD CKD-EPI 2021: 94.3 ML/MIN/1.73
GLUCOSE SERPL-MCNC: 97 MG/DL (ref 65–99)
HBA1C MFR BLD: 6.6 % (ref 4.8–5.6)
MICROALBUMIN UR-MCNC: <3 UG/ML
POTASSIUM SERPL-SCNC: 4.2 MMOL/L (ref 3.5–5.2)
SODIUM SERPL-SCNC: 139 MMOL/L (ref 136–145)

## 2023-05-28 RX ORDER — LISINOPRIL 20 MG/1
20 TABLET ORAL DAILY
Qty: 90 TABLET | Refills: 1 | Status: SHIPPED | OUTPATIENT
Start: 2023-05-28

## 2023-05-28 RX ORDER — METFORMIN HYDROCHLORIDE 500 MG/1
500 TABLET, EXTENDED RELEASE ORAL
Qty: 90 TABLET | Refills: 0 | Status: SHIPPED | OUTPATIENT
Start: 2023-05-28

## 2023-06-11 RX ORDER — LISINOPRIL 20 MG/1
TABLET ORAL
Qty: 30 TABLET | Refills: 0 | Status: SHIPPED | OUTPATIENT
Start: 2023-06-11

## 2023-09-20 ENCOUNTER — OFFICE VISIT (OUTPATIENT)
Dept: FAMILY MEDICINE CLINIC | Facility: CLINIC | Age: 73
End: 2023-09-20
Payer: MEDICARE

## 2023-09-20 VITALS
HEIGHT: 63 IN | RESPIRATION RATE: 16 BRPM | SYSTOLIC BLOOD PRESSURE: 100 MMHG | HEART RATE: 93 BPM | OXYGEN SATURATION: 99 % | TEMPERATURE: 97.6 F | BODY MASS INDEX: 29.95 KG/M2 | WEIGHT: 169 LBS | DIASTOLIC BLOOD PRESSURE: 70 MMHG

## 2023-09-20 DIAGNOSIS — E78.2 HYPERLIPIDEMIA, MIXED: ICD-10-CM

## 2023-09-20 DIAGNOSIS — E55.9 VITAMIN D DEFICIENCY: ICD-10-CM

## 2023-09-20 DIAGNOSIS — E11.9 TYPE 2 DIABETES MELLITUS WITHOUT COMPLICATION, WITHOUT LONG-TERM CURRENT USE OF INSULIN: Primary | ICD-10-CM

## 2023-09-20 DIAGNOSIS — R79.89 ABNORMAL TSH: ICD-10-CM

## 2023-09-20 RX ORDER — METFORMIN HYDROCHLORIDE 500 MG/1
1000 TABLET, EXTENDED RELEASE ORAL DAILY
Qty: 180 TABLET | Refills: 1 | Status: SHIPPED | OUTPATIENT
Start: 2023-09-20

## 2023-09-20 RX ORDER — LISINOPRIL 10 MG/1
10 TABLET ORAL DAILY
Qty: 90 TABLET | Refills: 1 | Status: SHIPPED | OUTPATIENT
Start: 2023-09-20

## 2023-09-20 NOTE — PROGRESS NOTES
"Subjective   Arcelia Cole is a 72 y.o. female.     History of Present Illness    Since the last visit, she has overall felt well.  She has DMII well controlled on medication and will continue regimen, Hyperlipidemia and patient declines my medical recommendation of medication management, Vitamin D deficiency and labs are at goal >30 ng/mL, and borderline thyroid TSH will update on next labs.  Also primary hypertension and blood pressure is a bit too low and will lower dose of lisinopril to 10 mg.  Great job on losing weight. .  she has been compliant with current medications have reviewed them.  The patient denies medication side effects.  Will refill medications. /70   Pulse 93   Temp 97.6 °F (36.4 °C)   Resp 16   Ht 160 cm (62.99\")   Wt 76.7 kg (169 lb)   LMP  (LMP Unknown)   SpO2 99%   BMI 29.95 kg/m²   She last saw Dr. Mcdonald on 5/26/2023 noting her type 2 diabetes and was prescribed by me to start metformin XR and lisinopril for hypertension.  Due to his patient's concern of hypoglycemia that was symptomatic reduce dose of her metformin and asked her to monitor her glucose and to bring readings to next appointment.  Results for orders placed or performed in visit on 05/26/23   Hemoglobin A1c    Specimen: Blood   Result Value Ref Range    Hemoglobin A1C 6.60 (H) 4.80 - 5.60 %   Microalbumin / Creatinine Urine Ratio - Urine, Clean Catch    Specimen: Urine, Clean Catch   Result Value Ref Range    Creatinine, Urine 32.9 Not Estab. mg/dL    Microalbumin, Urine <3.0 Not Estab. ug/mL    Microalbumin/Creatinine Ratio <9 0 - 29 mg/g creat   Basic metabolic panel    Specimen: Blood   Result Value Ref Range    Glucose 97 65 - 99 mg/dL    BUN 14 8 - 23 mg/dL    Creatinine 0.65 0.57 - 1.00 mg/dL    EGFR Result 94.3 >60.0 mL/min/1.73    BUN/Creatinine Ratio 21.5 7.0 - 25.0    Sodium 139 136 - 145 mmol/L    Potassium 4.2 3.5 - 5.2 mmol/L    Chloride 103 98 - 107 mmol/L    Total CO2 22.7 22.0 - 29.0 mmol/L    " Calcium 10.2 8.6 - 10.5 mg/dL   The 10-year ASCVD risk score (Bhavin RAMOS, et al., 2019) is: 15.6%    Values used to calculate the score:      Age: 72 years      Sex: Female      Is Non- : No      Diabetic: Yes      Tobacco smoker: No      Systolic Blood Pressure: 100 mmHg      Is BP treated: Yes      HDL Cholesterol: 76 mg/dL      Total Cholesterol: 204 mg/dL    Had bone density screening on 4/14/2023 noting osteopenia with overall FRAX score at 12% and hip score 2.6%  Screening mammogram 4/14/2023 negative  EKG 5/9/2021 no significant change from prior EKG, borderline EKG normal sinus rhythm LVH by voltage  Was seen by Ortho DR Rodriguez 7/24/2023 noting her osteoarthritis of left glenohumeral joint and was injected with Kenalog.  Home bp <130/80     diet and weight down  Home glucose about 120  The following portions of the patient's history were reviewed and updated as appropriate: allergies, current medications, past family history, past medical history, past social history, past surgical history, and problem list.    Review of Systems   Constitutional:  Negative for diaphoresis.   HENT:  Negative for nosebleeds and trouble swallowing.    Eyes:  Negative for blurred vision and visual disturbance.   Respiratory:  Negative for choking.    Gastrointestinal:  Negative for blood in stool.   Musculoskeletal:  Positive for arthralgias.   Allergic/Immunologic: Negative for immunocompromised state.   Neurological:  Negative for facial asymmetry and speech difficulty.   Psychiatric/Behavioral:  Negative for self-injury and suicidal ideas.      Objective   Physical Exam  Vitals and nursing note reviewed.   Constitutional:       General: She is not in acute distress.     Appearance: She is well-developed. She is not ill-appearing or toxic-appearing.   HENT:      Head: Normocephalic.      Right Ear: External ear normal.      Left Ear: External ear normal.      Nose: Nose normal.       Mouth/Throat:      Pharynx: Oropharynx is clear.   Eyes:      General: No scleral icterus.     Conjunctiva/sclera: Conjunctivae normal.      Pupils: Pupils are equal, round, and reactive to light.   Neck:      Thyroid: No thyromegaly.   Cardiovascular:      Rate and Rhythm: Normal rate and regular rhythm.      Pulses: Normal pulses.      Heart sounds: Normal heart sounds. No murmur heard.  Pulmonary:      Effort: Pulmonary effort is normal. No respiratory distress.      Breath sounds: Normal breath sounds.   Musculoskeletal:         General: No deformity. Normal range of motion.      Cervical back: Normal range of motion and neck supple.      Right lower leg: No edema.      Left lower leg: No edema.   Skin:     General: Skin is warm and dry.      Findings: No rash.   Neurological:      General: No focal deficit present.      Mental Status: She is alert and oriented to person, place, and time. Mental status is at baseline.   Psychiatric:         Mood and Affect: Mood normal.         Behavior: Behavior normal.         Thought Content: Thought content normal.         Judgment: Judgment normal.         Assessment & Plan   Diagnoses and all orders for this visit:    1. Type 2 diabetes mellitus without complication, without long-term current use of insulin (Primary)  -     Comprehensive metabolic panel; Future  -     Lipid panel; Future  -     CBC and Differential; Future  -     TSH; Future  -     Hemoglobin A1c; Future  -     Vitamin D,25-Hydroxy; Future  -     Vitamin B12; Future  -     Folate; Future  -     Urinalysis With Microscopic - Urine, Clean Catch; Future  -     Microalbumin / Creatinine Urine Ratio - Urine, Clean Catch; Future  -     T4, Free; Future  -     T3, Free; Future    2. Vitamin D deficiency  -     Comprehensive metabolic panel; Future  -     Lipid panel; Future  -     CBC and Differential; Future  -     TSH; Future  -     Hemoglobin A1c; Future  -     Vitamin D,25-Hydroxy; Future  -     Vitamin B12;  Future  -     Folate; Future  -     Urinalysis With Microscopic - Urine, Clean Catch; Future  -     Microalbumin / Creatinine Urine Ratio - Urine, Clean Catch; Future  -     T4, Free; Future  -     T3, Free; Future    3. Hyperlipidemia, mixed  -     Comprehensive metabolic panel; Future  -     Lipid panel; Future  -     CBC and Differential; Future  -     TSH; Future  -     Hemoglobin A1c; Future  -     Vitamin D,25-Hydroxy; Future  -     Vitamin B12; Future  -     Folate; Future  -     Urinalysis With Microscopic - Urine, Clean Catch; Future  -     Microalbumin / Creatinine Urine Ratio - Urine, Clean Catch; Future  -     T4, Free; Future  -     T3, Free; Future    4. Abnormal TSH  -     Comprehensive metabolic panel; Future  -     Lipid panel; Future  -     CBC and Differential; Future  -     TSH; Future  -     Hemoglobin A1c; Future  -     Vitamin D,25-Hydroxy; Future  -     Vitamin B12; Future  -     Folate; Future  -     Urinalysis With Microscopic - Urine, Clean Catch; Future  -     Microalbumin / Creatinine Urine Ratio - Urine, Clean Catch; Future  -     T4, Free; Future  -     T3, Free; Future    5. Psoriasis  -     Comprehensive metabolic panel; Future  -     Lipid panel; Future  -     CBC and Differential; Future  -     TSH; Future  -     Hemoglobin A1c; Future  -     Vitamin D,25-Hydroxy; Future  -     Vitamin B12; Future  -     Folate; Future  -     Urinalysis With Microscopic - Urine, Clean Catch; Future  -     Microalbumin / Creatinine Urine Ratio - Urine, Clean Catch; Future  -     T4, Free; Future  -     T3, Free; Future    Other orders  -     lisinopril (PRINIVIL,ZESTRIL) 10 MG tablet; Take 1 tablet by mouth Daily. For BP, new lower dose  Dispense: 90 tablet; Refill: 1  -     metFORMIN ER (GLUCOPHAGE-XR) 500 MG 24 hr tablet; Take 2 tablets by mouth Daily. With food for DMII  Dispense: 180 tablet; Refill: 1        She wants to wait on starting statin.  Chol score 18.6%  Need f/u on borderline thyroid  labs  Plan, Arcelia Cole, was seen today.  she was seen for DMII and refilled medications, Hyperlipidemia and decline my recommendation of medication for treatment, and Vitamin D deficiency and supplemented.  has Ultravate cream for her psoriasis works well as needed  On metformin she is taking 2 most days some days will take 1  Labs Nov  Need DM eye exam  She has done such a great job on losing weight her blood pressure is too low and I will decrease dose of lisinopril to 10 mg daily have her monitor blood pressure at home and update me if too high or too low and gave her parameters  See ortho for knee pain  Need eye exam

## 2024-02-05 ENCOUNTER — HOSPITAL ENCOUNTER (OUTPATIENT)
Facility: HOSPITAL | Age: 74
Discharge: HOME | End: 2024-02-05
Attending: EMERGENCY MEDICINE | Admitting: EMERGENCY MEDICINE
Payer: MEDICARE

## 2024-02-05 VITALS
TEMPERATURE: 98.6 F | WEIGHT: 160 LBS | SYSTOLIC BLOOD PRESSURE: 161 MMHG | DIASTOLIC BLOOD PRESSURE: 97 MMHG | HEIGHT: 63 IN | OXYGEN SATURATION: 99 % | RESPIRATION RATE: 16 BRPM | BODY MASS INDEX: 28.35 KG/M2 | HEART RATE: 93 BPM

## 2024-02-05 DIAGNOSIS — M79.89 FINGER SWELLING: Primary | ICD-10-CM

## 2024-02-05 PROCEDURE — G0463 HOSPITAL OUTPT CLINIC VISIT: HCPCS

## 2024-03-20 ENCOUNTER — OFFICE VISIT (OUTPATIENT)
Dept: FAMILY MEDICINE CLINIC | Facility: CLINIC | Age: 74
End: 2024-03-20
Payer: MEDICARE

## 2024-03-20 VITALS
HEIGHT: 63 IN | WEIGHT: 163 LBS | HEART RATE: 81 BPM | OXYGEN SATURATION: 97 % | SYSTOLIC BLOOD PRESSURE: 120 MMHG | RESPIRATION RATE: 16 BRPM | TEMPERATURE: 97.3 F | DIASTOLIC BLOOD PRESSURE: 80 MMHG | BODY MASS INDEX: 28.88 KG/M2

## 2024-03-20 DIAGNOSIS — E78.2 HYPERLIPIDEMIA, MIXED: ICD-10-CM

## 2024-03-20 DIAGNOSIS — E11.9 TYPE 2 DIABETES MELLITUS WITHOUT COMPLICATION, WITHOUT LONG-TERM CURRENT USE OF INSULIN: ICD-10-CM

## 2024-03-20 DIAGNOSIS — I10 PRIMARY HYPERTENSION: Primary | ICD-10-CM

## 2024-03-20 DIAGNOSIS — E78.1 HYPERTRIGLYCERIDEMIA: ICD-10-CM

## 2024-03-20 DIAGNOSIS — E03.9 HYPOTHYROIDISM, ACQUIRED: ICD-10-CM

## 2024-03-20 DIAGNOSIS — E55.9 VITAMIN D DEFICIENCY: ICD-10-CM

## 2024-03-20 RX ORDER — LISINOPRIL 10 MG/1
10 TABLET ORAL DAILY
Qty: 90 TABLET | Refills: 1 | Status: SHIPPED | OUTPATIENT
Start: 2024-03-20

## 2024-03-20 RX ORDER — METFORMIN HYDROCHLORIDE 500 MG/1
1000 TABLET, EXTENDED RELEASE ORAL DAILY
Qty: 180 TABLET | Refills: 1 | Status: SHIPPED | OUTPATIENT
Start: 2024-03-20

## 2024-03-20 RX ORDER — ROSUVASTATIN CALCIUM 5 MG/1
5 TABLET, COATED ORAL DAILY
Qty: 30 TABLET | Refills: 11 | Status: SHIPPED | OUTPATIENT
Start: 2024-03-20

## 2024-03-20 RX ORDER — LEVOTHYROXINE SODIUM 0.05 MG/1
50 TABLET ORAL DAILY
Qty: 90 TABLET | Refills: 1 | Status: SHIPPED | OUTPATIENT
Start: 2024-03-20

## 2024-03-20 NOTE — PATIENT INSTRUCTIONS
Diabetes Mellitus Basics    Diabetes mellitus, or diabetes, is a long-term (chronic) disease. It occurs when the body does not properly use sugar (glucose) that is released from food after you eat.  Diabetes mellitus may be caused by one or both of these problems:  Your pancreas does not make enough of a hormone called insulin.  Your body does not react in a normal way to the insulin that it makes.  Insulin lets glucose enter cells in your body. This gives you energy. If you have diabetes, glucose cannot get into cells. This causes high blood glucose (hyperglycemia).  How to treat and manage diabetes  You may need to take insulin or other diabetes medicines daily to keep your glucose in balance. If you are prescribed insulin, you will learn how to give yourself insulin by injection. You may need to adjust the amount of insulin you take based on the foods that you eat.  You will need to check your blood glucose levels using a glucose monitor as told by your health care provider. The readings can help determine if you have low or high blood glucose.  Generally, you should have these blood glucose levels:  Before meals (preprandial):  mg/dL (4.4-7.2 mmol/L).  After meals (postprandial): below 180 mg/dL (10 mmol/L).  Hemoglobin A1c (HbA1c) level: less than 7%.  Your health care provider will set treatment goals for you.  Keep all follow-up visits. This is important.  Follow these instructions at home:  Diabetes medicines  Take your diabetes medicines every day as told by your health care provider. List your diabetes medicines here:  Name of medicine: ______________________________  Amount (dose): _______________ Time (a.m./p.m.): _______________ Notes: ___________________________________  Name of medicine: ______________________________  Amount (dose): _______________ Time (a.m./p.m.): _______________ Notes: ___________________________________  Name of medicine: ______________________________  Amount (dose):  _______________ Time (a.m./p.m.): _______________ Notes: ___________________________________  Insulin  If you use insulin, list the types of insulin you use here:  Insulin type: ______________________________  Amount (dose): _______________ Time (a.m./p.m.): _______________Notes: ___________________________________  Insulin type: ______________________________  Amount (dose): _______________ Time (a.m./p.m.): _______________ Notes: ___________________________________  Insulin type: ______________________________  Amount (dose): _______________ Time (a.m./p.m.): _______________ Notes: ___________________________________  Insulin type: ______________________________  Amount (dose): _______________ Time (a.m./p.m.): _______________ Notes: ___________________________________  Insulin type: ______________________________  Amount (dose): _______________ Time (a.m./p.m.): _______________ Notes: ___________________________________  Managing blood glucose    Check your blood glucose levels using a glucose monitor as told by your health care provider.  Write down the times that you check your glucose levels here:  Time: _______________ Notes: ___________________________________  Time: _______________ Notes: ___________________________________  Time: _______________ Notes: ___________________________________  Time: _______________ Notes: ___________________________________  Time: _______________ Notes: ___________________________________  Time: _______________ Notes: ___________________________________    Low blood glucose  Low blood glucose (hypoglycemia) is when glucose is at or below 70 mg/dL (3.9 mmol/L). Symptoms may include:  Feeling:  Hungry.  Sweaty and clammy.  Irritable or easily upset.  Dizzy.  Sleepy.  Having:  A fast heartbeat.  A headache.  A change in your vision.  Numbness around the mouth, lips, or tongue.  Having trouble with:  Moving (coordination).  Sleeping.  Treating low blood glucose  To treat low blood  glucose, eat or drink something containing sugar right away. If you can think clearly and swallow safely, follow the 15:15 rule:  Take 15 grams of a fast-acting carb (carbohydrate), as told by your health care provider.  Some fast-acting carbs are:  Glucose tablets: take 3-4 tablets.  Hard candy: eat 3-5 pieces.  Fruit juice: drink 4 oz (120 mL).  Regular (not diet) soda: drink 4-6 oz (120-180 mL).  Honey or sugar: eat 1 Tbsp (15 mL).  Check your blood glucose levels 15 minutes after you take the carb.  If your glucose is still at or below 70 mg/dL (3.9 mmol/L), take 15 grams of a carb again.  If your glucose does not go above 70 mg/dL (3.9 mmol/L) after 3 tries, get help right away.  After your glucose goes back to normal, eat a meal or a snack within 1 hour.  Treating very low blood glucose  If your glucose is at or below 54 mg/dL (3 mmol/L), you have very low blood glucose (severe hypoglycemia).  This is an emergency. Do not wait to see if the symptoms will go away. Get medical help right away. Call your local emergency services (911 in the [place] Do not drive yourself to the hospital.  Questions to ask your health care provider  Should I talk with a diabetes educator?  What equipment will I need to care for myself at home?  What diabetes medicines do I need? When should I take them?  How often do I need to check my blood glucose levels?  What number can I call if I have questions?  When is my follow-up visit?  Where can I find a support group for people with diabetes?  Where to find more information  American Diabetes Association: www.diabetes.org  Association of Diabetes Care and Education Specialists: www.diabeteseducator.org  Contact a health care provider if:  Your blood glucose is at or above 240 mg/dL (13.3 mmol/L) for 2 days in a row.  You have been sick or have had a fever for 2 days or more, and you are not getting better.  You have any of these problems for more than 6 hours:  You cannot eat or  drink.  You feel nauseous.  You vomit.  You have diarrhea.  Get help right away if:  Your blood glucose is lower than 54 mg/dL (3 mmol/L).  You get confused.  You have trouble thinking clearly.  You have trouble breathing.  These symptoms may represent a serious problem that is an emergency. Do not wait to see if the symptoms will go away. Get medical help right away. Call your local emergency services (911 in the [place] Do not drive yourself to the hospital.  Summary  Diabetes mellitus is a chronic disease that occurs when the body does not properly use sugar (glucose) that is released from food after you eat.  Take insulin and diabetes medicines as told.  Check your blood glucose every day, as often as told.  Keep all follow-up visits. This is important.  This information is not intended to replace advice given to you by your health care provider. Make sure you discuss any questions you have with your health care provider.  Document Revised: 04/20/2021 Document Reviewed: 04/20/2021  [employer] Patient Education © 2023 [employer]

## 2024-03-20 NOTE — PROGRESS NOTES
"Subjective   Areclia Cole is a 73 y.o. female.     Hypertension  Pertinent negatives include no blurred vision.   Hyperlipidemia  Exacerbating diseases include hypothyroidism.   Diabetes  Pertinent negatives for hypoglycemia include no speech difficulty. Pertinent negatives for diabetes include no blurred vision.   Hypothyroidism  Pertinent negatives include no diaphoresis.       Since the last visit, she has overall felt well.  She has Primary Hypertension and well controlled on current medication, DMII well controlled on medication and will continue regimen, Hyperlipidemia and will start medication plan for treatment.  I will order follow up labs, Hypothyroidism and will start medication with plan for follow up labs, and Vitamin D deficiency and labs are at goal >30 ng/mL.  she has been compliant with current medications have reviewed them.  The patient denies medication side effects.  Will refill medications. /78   Pulse 81   Temp 97.3 °F (36.3 °C)   Resp 16   Ht 160 cm (63\")   Wt 73.9 kg (163 lb)   LMP  (LMP Unknown)   SpO2 97%   BMI 28.87 kg/m²     Results for orders placed or performed in visit on 11/01/23   Comprehensive metabolic panel    Specimen: Blood   Result Value Ref Range    Glucose 111 (H) 70 - 99 mg/dL    BUN 14 8 - 27 mg/dL    Creatinine 0.79 0.57 - 1.00 mg/dL    EGFR Result 79 >59 mL/min/1.73    BUN/Creatinine Ratio 18 12 - 28    Sodium 140 134 - 144 mmol/L    Potassium 4.8 3.5 - 5.2 mmol/L    Chloride 102 96 - 106 mmol/L    Total CO2 24 20 - 29 mmol/L    Calcium 9.4 8.7 - 10.3 mg/dL    Total Protein 6.4 6.0 - 8.5 g/dL    Albumin 4.2 3.8 - 4.8 g/dL    Globulin 2.2 1.5 - 4.5 g/dL    A/G Ratio 1.9 1.2 - 2.2    Total Bilirubin <0.2 0.0 - 1.2 mg/dL    Alkaline Phosphatase 105 44 - 121 IU/L    AST (SGOT) 16 0 - 40 IU/L    ALT (SGPT) 21 0 - 32 IU/L   Lipid panel    Specimen: Blood   Result Value Ref Range    Total Cholesterol 197 100 - 199 mg/dL    Triglycerides 278 (H) 0 - 149 mg/dL "    HDL Cholesterol 64 >39 mg/dL    VLDL Cholesterol Kwaku 45 (H) 5 - 40 mg/dL    LDL Chol Calc (NIH) 88 0 - 99 mg/dL   TSH    Specimen: Blood   Result Value Ref Range    TSH 5.670 (H) 0.450 - 4.500 uIU/mL   Hemoglobin A1c    Specimen: Blood   Result Value Ref Range    Hemoglobin A1C 6.3 (H) 4.8 - 5.6 %   Vitamin D,25-Hydroxy    Specimen: Blood   Result Value Ref Range    25 Hydroxy, Vitamin D 36.7 30.0 - 100.0 ng/mL   Vitamin B12    Specimen: Blood   Result Value Ref Range    Vitamin B-12 664 232 - 1,245 pg/mL   Folate    Specimen: Blood   Result Value Ref Range    Folate >20.0 >3.0 ng/mL   Microalbumin / Creatinine Urine Ratio - Urine, Clean Catch    Specimen: Urine, Clean Catch   Result Value Ref Range    Creatinine, Urine 84.6 Not Estab. mg/dL    Microalbumin, Urine <3.0 Not Estab. ug/mL    Microalbumin/Creatinine Ratio <4 0 - 29 mg/g creat   T4, Free    Specimen: Blood   Result Value Ref Range    Free T4 0.79 (L) 0.82 - 1.77 ng/dL   T3, Free    Specimen: Blood   Result Value Ref Range    T3, Free 2.5 2.0 - 4.4 pg/mL   Microscopic Examination -   Result Value Ref Range    WBC, UA 0-5 0 - 5 /hpf    RBC, UA 0-2 0 - 2 /hpf    Epithelial Cells (non renal) 0-10 0 - 10 /hpf    Casts None seen None seen /lpf    Bacteria, UA Few None seen/Few   CBC and Differential    Specimen: Blood   Result Value Ref Range    WBC 9.3 3.4 - 10.8 x10E3/uL    RBC 4.80 3.77 - 5.28 x10E6/uL    Hemoglobin 14.0 11.1 - 15.9 g/dL    Hematocrit 42.7 34.0 - 46.6 %    MCV 89 79 - 97 fL    MCH 29.2 26.6 - 33.0 pg    MCHC 32.8 31.5 - 35.7 g/dL    RDW 12.6 11.7 - 15.4 %    Platelets 362 150 - 450 x10E3/uL    Neutrophil Rel % 68 Not Estab. %    Lymphocyte Rel % 20 Not Estab. %    Monocyte Rel % 8 Not Estab. %    Eosinophil Rel % 2 Not Estab. %    Basophil Rel % 1 Not Estab. %    Neutrophils Absolute 6.4 1.4 - 7.0 x10E3/uL    Lymphocytes Absolute 1.9 0.7 - 3.1 x10E3/uL    Monocytes Absolute 0.7 0.1 - 0.9 x10E3/uL    Eosinophils Absolute 0.2 0.0 - 0.4  x10E3/uL    Basophils Absolute 0.1 0.0 - 0.2 x10E3/uL    Immature Granulocyte Rel % 1 Not Estab. %    Immature Grans Absolute 0.1 0.0 - 0.1 x10E3/uL   Urinalysis With Microscopic - Urine, Clean Catch    Specimen: Urine, Clean Catch   Result Value Ref Range    Specific Gravity, UA 1.017 1.005 - 1.030    pH, UA 5.5 5.0 - 7.5    Color, UA Yellow Yellow    Appearance, UA Clear Clear    Leukocytes, UA Trace (A) Negative    Protein Negative Negative/Trace    Glucose, UA Negative Negative    Ketones Negative Negative    Blood, UA Negative Negative    Bilirubin, UA Negative Negative    Urobilinogen, UA 0.2 0.2 - 1.0 mg/dL    Nitrite, UA Negative Negative    Microscopic Examination See below:    The 10-year ASCVD risk score (Bhavin RAMOS, et al., 2019) is: 23.1%    Values used to calculate the score:      Age: 73 years      Sex: Female      Is Non- : No      Diabetic: Yes      Tobacco smoker: No      Systolic Blood Pressure: 116 mmHg      Is BP treated: Yes      HDL Cholesterol: 64 mg/dL      Total Cholesterol: 197 mg/dL    Her last cholesterol score was 18.6% and she wanted to wait on starting a statin and also noted last visit follow-up on thyroid labs they were borderline on past  Ultravate cream for her psoriasis works well as needed   Had bone density screening on 4/14/2023 noting osteopenia with overall FRAX score at 12% and hip score 2.6%  Screening mammogram 4/14/2023 negative  EKG 5/9/2021 no significant change from prior EKG, borderline EKG normal sinus rhythm LVH by voltage  I lowered Lisinopril last visit--bp low    Went to ER 2-5-24----left 4th finger edema and had to cut ring off    Recent injection right knee. ---not much help-----[facility] appt 1-17-24 DR Rodriguez--grade 2 OA notes in chart    Some walking for exercise--knee pain limits    The following portions of the patient's history were reviewed and updated as appropriate: allergies, current medications, past family history, past medical  history, past social history, past surgical history, and problem list.    Review of Systems   Constitutional:  Negative for diaphoresis.   HENT:  Negative for nosebleeds and trouble swallowing.    Eyes:  Negative for blurred vision and visual disturbance.   Respiratory:  Negative for choking.    Gastrointestinal:  Negative for blood in stool.   Allergic/Immunologic: Negative for immunocompromised state.   Neurological:  Negative for facial asymmetry and speech difficulty.   Psychiatric/Behavioral:  Negative for self-injury and suicidal ideas.        Objective   Physical Exam  Vitals and nursing note reviewed.   Constitutional:       General: She is not in acute distress.     Appearance: She is well-developed. She is not ill-appearing or toxic-appearing.   HENT:      Head: Normocephalic.      Right Ear: External ear normal.      Left Ear: External ear normal.      Nose: Nose normal.      Mouth/Throat:      Pharynx: Oropharynx is clear.   Eyes:      General: No scleral icterus.     Conjunctiva/sclera: Conjunctivae normal.      Pupils: Pupils are equal, round, and reactive to light.   Neck:      Thyroid: No thyromegaly.      Vascular: No carotid bruit.   Cardiovascular:      Rate and Rhythm: Normal rate and regular rhythm.      Pulses: Normal pulses.      Heart sounds: Normal heart sounds. No murmur heard.  Pulmonary:      Effort: Pulmonary effort is normal. No respiratory distress.      Breath sounds: Normal breath sounds.   Musculoskeletal:         General: No deformity. Normal range of motion.      Cervical back: Normal range of motion and neck supple.      Right lower leg: No edema.      Left lower leg: No edema.   Skin:     General: Skin is warm and dry.      Findings: No rash.   Neurological:      General: No focal deficit present.      Mental Status: She is alert and oriented to person, place, and time. Mental status is at baseline.   Psychiatric:         Mood and Affect: Mood normal.         Behavior: Behavior  normal.         Thought Content: Thought content normal.         Judgment: Judgment normal.           Assessment & Plan   Diagnoses and all orders for this visit:    1. Primary hypertension (Primary)  -     Comprehensive metabolic panel; Future  -     Lipid panel; Future  -     Hemoglobin A1c; Future  -     TSH; Future  -     T4, Free; Future    2. Hyperlipidemia, mixed  -     Comprehensive metabolic panel; Future  -     Lipid panel; Future  -     Hemoglobin A1c; Future  -     TSH; Future  -     T4, Free; Future    3. Hypertriglyceridemia  -     Comprehensive metabolic panel; Future  -     Lipid panel; Future  -     Hemoglobin A1c; Future  -     TSH; Future  -     T4, Free; Future    4. Type 2 diabetes mellitus without complication, without long-term current use of insulin  -     Comprehensive metabolic panel; Future  -     Lipid panel; Future  -     Hemoglobin A1c; Future  -     TSH; Future  -     T4, Free; Future    5. Hypothyroidism, acquired  -     Comprehensive metabolic panel; Future  -     Lipid panel; Future  -     Hemoglobin A1c; Future  -     TSH; Future  -     T4, Free; Future    6. Vitamin D deficiency  -     Comprehensive metabolic panel; Future  -     Lipid panel; Future  -     Hemoglobin A1c; Future  -     TSH; Future  -     T4, Free; Future    Other orders  -     levothyroxine (Synthroid) 50 MCG tablet; Take 1 tablet by mouth Daily. For thyroid before breakfast  Dispense: 90 tablet; Refill: 1  -     rosuvastatin (Crestor) 5 MG tablet; Take 1 tablet by mouth Daily. For cholesterol  Dispense: 30 tablet; Refill: 11        She declines mammogram this year and plans to do April 2025 for screening  Considering calcium cardiac CT score we will go ahead and start a statin and her mom had heart disease in her 50s  F/u ortho Dr. Rodriguez right knee pain----and is f/u on shoulder   Labs due 3 mos  Plan, Arcelia oCle, was seen today.  she was seen for HTN and continue medication, DMII stable and refilled  medications, Hyperlipidemia with new medication plan, Hypothyroidism and will start medication for this with f/u labs, and Vitamin D deficiency and supplemented.  She is due for the second shingles vaccine I do want her to consider the RSV vaccine

## 2024-04-13 ENCOUNTER — HOSPITAL ENCOUNTER (OUTPATIENT)
Facility: HOSPITAL | Age: 74
Discharge: HOME | End: 2024-04-13
Attending: EMERGENCY MEDICINE | Admitting: EMERGENCY MEDICINE
Payer: MEDICARE

## 2024-04-13 VITALS
DIASTOLIC BLOOD PRESSURE: 76 MMHG | OXYGEN SATURATION: 96 % | RESPIRATION RATE: 18 BRPM | WEIGHT: 160 LBS | HEIGHT: 63 IN | SYSTOLIC BLOOD PRESSURE: 129 MMHG | HEART RATE: 91 BPM | BODY MASS INDEX: 28.35 KG/M2 | TEMPERATURE: 96.8 F

## 2024-04-13 DIAGNOSIS — J06.9 UPPER RESPIRATORY TRACT INFECTION, UNSPECIFIED TYPE: Primary | ICD-10-CM

## 2024-04-13 PROCEDURE — G0463 HOSPITAL OUTPT CLINIC VISIT: HCPCS | Performed by: NURSE PRACTITIONER

## 2024-04-13 PROCEDURE — 99212 OFFICE O/P EST SF 10 MIN: CPT | Performed by: NURSE PRACTITIONER

## 2024-04-13 RX ORDER — FLUTICASONE PROPIONATE 50 MCG
2 SPRAY, SUSPENSION (ML) NASAL DAILY
Qty: 30 ML | Refills: 0 | Status: SHIPPED | OUTPATIENT
Start: 2024-04-13

## 2024-04-13 RX ORDER — BENZONATATE 100 MG/1
100 CAPSULE ORAL 3 TIMES DAILY PRN
Qty: 20 CAPSULE | Refills: 0 | Status: SHIPPED | OUTPATIENT
Start: 2024-04-13

## 2024-04-13 RX ORDER — AMOXICILLIN 500 MG/1
500 CAPSULE ORAL 2 TIMES DAILY
Qty: 20 CAPSULE | Refills: 0 | Status: SHIPPED | OUTPATIENT
Start: 2024-04-13

## 2024-04-13 NOTE — FSED PROVIDER NOTE
Subjective   History of Present Illness  Patient is 73-year-old female who presents complaining of congestion, sore throat for the last month.  States she has not really been taking anything for it, denies fever or chills.  States she has not really had seasonal allergies in the past.  She denies chest pain, shortness of breath.      Review of Systems   HENT:  Positive for congestion, postnasal drip and sore throat.    All other systems reviewed and are negative.      Past Medical History:   Diagnosis Date    Arthritis 2020    Hypertension 2023    Menopause     Psoriasis        No Known Allergies    Past Surgical History:   Procedure Laterality Date    APPENDECTOMY  1961    COLONOSCOPY  2019    MANDIBLE SURGERY      WRIST SURGERY         Family History   Problem Relation Age of Onset    Diabetes Mother     Heart disease Mother     Diabetes Father     No Known Problems Sister     No Known Problems Brother     Alcohol abuse Daughter         44    No Known Problems Son     Cancer Maternal Grandmother         BLADDER     No Known Problems Paternal Grandmother     No Known Problems Maternal Grandfather     No Known Problems Paternal Grandfather     Breast cancer Neg Hx        Social History     Socioeconomic History    Marital status:    Tobacco Use    Smoking status: Never    Smokeless tobacco: Never   Substance and Sexual Activity    Alcohol use: Yes     Alcohol/week: 1.0 standard drink of alcohol     Types: 1 Glasses of wine per week     Comment: occasional    Drug use: Never           Objective   Physical Exam  Vitals and nursing note reviewed.   Constitutional:       Appearance: Normal appearance.   HENT:      Head: Normocephalic and atraumatic.      Nose: Rhinorrhea present.      Mouth/Throat:      Mouth: Mucous membranes are moist.      Pharynx: Posterior oropharyngeal erythema present.   Cardiovascular:      Rate and Rhythm: Normal rate and regular rhythm.      Pulses: Normal pulses.   Pulmonary:       Effort: Pulmonary effort is normal.      Breath sounds: Normal breath sounds.   Musculoskeletal:      Cervical back: Normal range of motion and neck supple.   Skin:     General: Skin is warm and dry.      Capillary Refill: Capillary refill takes less than 2 seconds.   Neurological:      General: No focal deficit present.      Mental Status: She is alert and oriented to person, place, and time.         Procedures           ED Course                                           Medical Decision Making  Likely URI versus seasonal allergies.  Will cover with amoxicillin due to length of illness, she is to start taking over-the-counter antihistamines.  She is otherwise nontoxic and was given strict return precautions.    Problems Addressed:  Upper respiratory tract infection, unspecified type: complicated acute illness or injury    Risk  Prescription drug management.        Final diagnoses:   Upper respiratory tract infection, unspecified type       ED Disposition  ED Disposition       ED Disposition   Discharge    Condition   Stable    Comment   --               Ana Pantoja, ANA  [address]  [place] [address]  [phone]    Call   If symptoms worsen         Medication List        New Prescriptions      amoxicillin 500 MG capsule  Commonly known as: AMOXIL  Take 1 capsule by mouth 2 (Two) Times a Day.     benzonatate 100 MG capsule  Commonly known as: TESSALON  Take 1 capsule by mouth 3 (Three) Times a Day As Needed for Cough.     fluticasone 50 MCG/ACT nasal spray  Commonly known as: FLONASE  2 sprays into the nostril(s) as directed by provider Daily.               Where to Get Your Medications        These medications were sent to [id number] - [place], [place] - [address] AT [facility] - [phone]  - [phone]   [address], [place] [address]      Phone: [phone]   amoxicillin 500 MG capsule  benzonatate 100 MG capsule  fluticasone  50 MCG/ACT nasal spray

## 2024-07-16 ENCOUNTER — TREATMENT (OUTPATIENT)
Dept: PHYSICAL THERAPY | Facility: CLINIC | Age: 74
End: 2024-07-16
Payer: MEDICARE

## 2024-07-16 DIAGNOSIS — R26.81 GAIT INSTABILITY: Primary | ICD-10-CM

## 2024-07-16 NOTE — PROGRESS NOTES
Physical Therapy Initial Evaluation and Plan of Care  [address], [address]  [place] [address]    Patient: Arcelia Cole   : 1950  Diagnosis/ICD-10 Code:  Gait instability [R26.81]  Referring practitioner: Dolly Rodriguez MD  Date of Initial Visit: 2024  Today's Date: 2024  Patient seen for 1 session         Visit Diagnoses:    ICD-10-CM ICD-9-CM   1. Gait instability  R26.81 781.2         Subjective Questionnaire: ABC: 49      Subjective Evaluation    History of Present Illness  Mechanism of injury: Patient is a 73 year old female who presents with gait issues that have been bothering her since October of last year.  Reports having trouble balancing.    Reports getting shots in both knees due to arthritis.  Denies any falls.  Denies any recent injuries.  Reports having a back issue back in  due to working from home.  Reports some dizziness when she is falling.  Reports weakness in both legs that has recently started.    Denies any knee, hip or back surgeries.      Patient Occupation: Retired, but works part time doing  Pain  Current pain ratin  At worst pain ratin  Location: bilateral thighs prn             Objective          Postural Observations    Additional Postural Observation Details  Patient ambulates with a slow guarded gait.  Slow, guarded sit to stand requiring the use of both hands to push up.    Strength/Myotome Testing     Left Hip   Planes of Motion   Flexion: 4-  Abduction: 4  Adduction: 4    Right Hip   Planes of Motion   Flexion: 4-  Abduction: 4  Adduction: 4    Left Knee   Extension: 4    Right Knee   Extension: 4    Left Ankle/Foot   Dorsiflexion: 5    Right Ankle/Foot   Dorsiflexion: 5    Functional Assessment     Comments  Patient can go from sitting to standing 5 times in 24 seconds.  Patient is able to make a 180 degree turn to the left in 5 seconds and to the right in 4 seconds.          Assessment & Plan        Assessment  Impairments: abnormal gait, activity intolerance, impaired balance, impaired physical strength, lacks appropriate home exercise program and pain with function   Assessment details: Patient is a 73 year old female who presents with c/o pain, impaired balance, decreased strength and an antalgic gait pattern which is limiting her ability to perform activities.  Barriers to therapy: none  Prognosis: good  Prognosis details: STG's to be met by 2 weeks  1)  Independent with HEP to show compliance  2)  Decrease pain by 50% or more to allow patient to perform self care and activities more comfortably  3)  Patient to go from sitting to standing 5 times in 21 seconds or less from improved transitions  4)  Patient to perform SLS on each leg up to 7 seconds without the use of the hands for improved proprioception and reduced fall risk    LTG's to be met by 4 weeks  1)  Independent with HEP progression to show continued compliance  2)  Decrease pain by 75% or more to allow patient to return to activities and hobbies with minimal limitations   3)  Increase strength for the LE to 4+/5 for improved stability with gait and stairs  4)  Patient to ambulate with a minimal antalgic gait pattern to improve navigating in her house and in the community  5)  Patient to perform SLS up to 10 seconds on each leg without the use of the hands for improved ability to navigate on uneven surfaces        Plan  Therapy options: will be seen for skilled therapy services  Planned therapy interventions: strengthening, therapeutic activities, home exercise program and gait training  Frequency: 2x week  Duration in weeks: 4  Treatment plan discussed with: patient            Timed:         Manual Therapy:    0     mins  33318;     Therapeutic Exercise:    9     mins  90077;     Neuromuscular Yue:    0    mins  95832;    Therapeutic Activity:     5     mins  88570;     Gait Trainin     mins  92408;     Ultrasound:     0     mins   91458;          Un-Timed:  Electrical Stimulation:    0     mins  85322 ( );    Low Eval     16     Mins  27156  Mod Eval     0     Mins  70992  High Eval                       0     Mins  04431        Timed Treatment:   14   mins   Total Treatment:     30   mins          PT: Jules Rubio, PT     [place] License [id number]  Electronically signed by Jules Rubio PT, 07/16/24, 1:43 PM EDT    Certification Period: 7/16/2024 thru 10/13/2024  I certify that the therapy services are furnished while this patient is under my care.  The services outlined above are required by this patient, and will be reviewed every 90 days.    Dolly Rodriguez Md  [address] [place] [address]   NPI: [id number]      Jules Rubio, PT   License number: [id number]        Physician Signature:__________________________________________________    PHYSICIAN: Dolly Rodriguez MD      DATE:     Please sign and return via fax to [email or web address] . Thank you, [facility].

## 2024-07-24 ENCOUNTER — TREATMENT (OUTPATIENT)
Dept: PHYSICAL THERAPY | Facility: CLINIC | Age: 74
End: 2024-07-24
Payer: MEDICARE

## 2024-07-24 DIAGNOSIS — R26.81 GAIT INSTABILITY: Primary | ICD-10-CM

## 2024-07-24 NOTE — PROGRESS NOTES
Physical Therapy Daily Treatment Note  [address], [address]  [place] [address]      Patient: Arcelia Cole   : 1950  Referring practitioner: Dolly Rodriguez MD  Date of Initial Visit: Type: THERAPY  Noted: 2024  Today's Date: 2024  Patient seen for 2 sessions       Visit Diagnoses:    ICD-10-CM ICD-9-CM   1. Gait instability  R26.81 781.2           Subjective   Patient reports that she has been doing the HEP consistently at home.  Reports that she feels more stable and stronger with walking and reports that she isn't as wobbly as she was.    Objective   See Exercise, Manual, and Modality Logs for complete treatment.       Assessment/Plan  Subjective reports continue to improve since beginning PT.  Added standing hip abd, standing hip ext and standing march to the routine to progress the strength in the LE which will improve stability with gait and with stairs.  Patient tolerated the progression of strengthening exercises without c/o.  Plan to initiate SLS and forward step with OH reach for proprioception.      Timed:         Manual Therapy:    0     mins  24589;     Therapeutic Exercise:    15     mins  19514;     Neuromuscular Yue:    0    mins  30294;    Therapeutic Activity:     8     mins  92910;     Gait Training      0    mins  22948;  Work Conditioning     0   mins  55142       Untimed:  Electrical Stimulation:    0     mins  53779 ( );      Timed Treatment:   23   mins   Total Treatment:     23   mins    Jules Rubio, PT  [place] License: [id number]

## 2024-07-31 ENCOUNTER — TREATMENT (OUTPATIENT)
Dept: PHYSICAL THERAPY | Facility: CLINIC | Age: 74
End: 2024-07-31
Payer: MEDICARE

## 2024-07-31 DIAGNOSIS — R26.81 GAIT INSTABILITY: Primary | ICD-10-CM

## 2024-07-31 NOTE — PROGRESS NOTES
Physical Therapy Daily Treatment Note  [address], [address]  [place] [address]      Patient: Arcelia Cole   : 1950  Referring practitioner: Dolly Rodriguez MD  Date of Initial Visit: Type: THERAPY  Noted: 2024  Today's Date: 2024  Patient seen for 3 sessions       Visit Diagnoses:    ICD-10-CM ICD-9-CM   1. Gait instability  R26.81 781.2           Subjective   Patient reports that she is still better since beginning PT, reports that the legs are getting stronger.    Objective   See Exercise, Manual, and Modality Logs for complete treatment.       Assessment/Plan  Subjective reports continue to be improved since beginning PT.  Added tandem stance and forward step with OH reach for proprioception which will improve balance and decrease fall risk.  Patient tolerated the progression of open and closed chain activities without c/o, but did have decreased balance with the forward steps.      Timed:         Manual Therapy:    0     mins  91598;     Therapeutic Exercise:    16     mins  44618;     Neuromuscular Yue:    0    mins  59445;    Therapeutic Activity:     8     mins  98020;     Gait Training      0    mins  23760;  Work Conditioning     0   mins  26379       Untimed:  Electrical Stimulation:    0     mins  41049 ( );      Timed Treatment:   24   mins   Total Treatment:     24   mins    Jules Rubio, PT  [place] License: [id number]

## 2024-08-09 ENCOUNTER — HOSPITAL ENCOUNTER (OUTPATIENT)
Facility: HOSPITAL | Age: 74
Discharge: HOME | End: 2024-08-09
Attending: EMERGENCY MEDICINE | Admitting: EMERGENCY MEDICINE
Payer: MEDICARE

## 2024-08-09 ENCOUNTER — APPOINTMENT (OUTPATIENT)
Dept: GENERAL RADIOLOGY | Facility: HOSPITAL | Age: 74
End: 2024-08-09
Payer: MEDICARE

## 2024-08-09 VITALS
DIASTOLIC BLOOD PRESSURE: 87 MMHG | OXYGEN SATURATION: 99 % | HEIGHT: 63 IN | SYSTOLIC BLOOD PRESSURE: 163 MMHG | RESPIRATION RATE: 18 BRPM | TEMPERATURE: 97.8 F | BODY MASS INDEX: 29.23 KG/M2 | HEART RATE: 83 BPM | WEIGHT: 165 LBS

## 2024-08-09 DIAGNOSIS — J40 BRONCHITIS: Primary | ICD-10-CM

## 2024-08-09 LAB
FLUAV SUBTYP SPEC NAA+PROBE: NOT DETECTED
FLUBV RNA ISLT QL NAA+PROBE: NOT DETECTED
SARS-COV-2 RNA RESP QL NAA+PROBE: NOT DETECTED
STREP A PCR: NOT DETECTED

## 2024-08-09 PROCEDURE — 87636 SARSCOV2 & INF A&B AMP PRB: CPT

## 2024-08-09 PROCEDURE — 71046 X-RAY EXAM CHEST 2 VIEWS: CPT

## 2024-08-09 PROCEDURE — G0463 HOSPITAL OUTPT CLINIC VISIT: HCPCS | Performed by: EMERGENCY MEDICINE

## 2024-08-09 PROCEDURE — 63710000001 PREDNISONE PER 1 MG: Performed by: EMERGENCY MEDICINE

## 2024-08-09 PROCEDURE — 99213 OFFICE O/P EST LOW 20 MIN: CPT | Performed by: EMERGENCY MEDICINE

## 2024-08-09 PROCEDURE — 87651 STREP A DNA AMP PROBE: CPT

## 2024-08-09 RX ORDER — PREDNISONE 20 MG/1
20 TABLET ORAL 2 TIMES DAILY
Qty: 10 TABLET | Refills: 0 | Status: SHIPPED | OUTPATIENT
Start: 2024-08-09 | End: 2024-08-14

## 2024-08-09 RX ORDER — CEPHALEXIN 500 MG/1
500 CAPSULE ORAL 3 TIMES DAILY
Qty: 14 CAPSULE | Refills: 0 | Status: SHIPPED | OUTPATIENT
Start: 2024-08-09 | End: 2024-08-14

## 2024-08-09 RX ORDER — CEPHALEXIN 500 MG/1
500 CAPSULE ORAL ONCE
Status: COMPLETED | OUTPATIENT
Start: 2024-08-09 | End: 2024-08-09

## 2024-08-09 RX ORDER — PREDNISONE 20 MG/1
20 TABLET ORAL ONCE
Status: COMPLETED | OUTPATIENT
Start: 2024-08-09 | End: 2024-08-09

## 2024-08-09 RX ADMIN — PREDNISONE 20 MG: 20 TABLET ORAL at 15:31

## 2024-08-09 RX ADMIN — CEPHALEXIN 500 MG: 500 CAPSULE ORAL at 15:31

## 2024-08-09 NOTE — DISCHARGE INSTRUCTIONS
COVID flu and strep test were negative.  The chest x-ray was negative.  You are given your first dose of antibiotic here and your next dose is due tomorrow morning a prescription for it was sent to your pharmacy.  You are given a small dose of steroid here today and your next dose is due tomorrow and a prescription has been sent to your pharmacy for this.  Please follow-up with your primary care doctor in a week.  If anything changes that concerns you you are welcome to return here.

## 2024-08-09 NOTE — FSED PROVIDER NOTE
Subjective   History of Present Illness  73-year-old female with 2-3 weeks of productive cough, no color, sinus discomfort, not sleeping well, fatigue and in the last few days having sore throat. Has not tested for COVID.  No known fever, no headache, no stiff or sore neck or rash, no chest pain or back pain abdominal pain or nausea or vomiting or diarrhea.      Review of Systems    Past Medical History:   Diagnosis Date    Arthritis 2020    Hypertension 2023    Menopause     Psoriasis        No Known Allergies    Past Surgical History:   Procedure Laterality Date    APPENDECTOMY  1961    COLONOSCOPY  2019    MANDIBLE SURGERY      WRIST SURGERY         Family History   Problem Relation Age of Onset    Diabetes Mother     Heart disease Mother     Diabetes Father     No Known Problems Sister     No Known Problems Brother     Alcohol abuse Daughter         44    No Known Problems Son     Cancer Maternal Grandmother         BLADDER     No Known Problems Paternal Grandmother     No Known Problems Maternal Grandfather     No Known Problems Paternal Grandfather     Breast cancer Neg Hx        Social History     Socioeconomic History    Marital status:    Tobacco Use    Smoking status: Never    Smokeless tobacco: Never   Substance and Sexual Activity    Alcohol use: Yes     Alcohol/week: 1.0 standard drink of alcohol     Types: 1 Glasses of wine per week     Comment: occasional    Drug use: Never           Objective   Physical Exam  Vitals and nursing note reviewed.   Constitutional:       Appearance: She is well-developed.   HENT:      Mouth/Throat:      Mouth: Mucous membranes are moist.      Pharynx: Oropharynx is clear. No pharyngeal swelling, oropharyngeal exudate or posterior oropharyngeal erythema.   Eyes:      Conjunctiva/sclera: Conjunctivae normal.   Cardiovascular:      Rate and Rhythm: Normal rate and regular rhythm.   Pulmonary:      Effort: No respiratory distress.      Breath sounds: Normal breath  sounds. No stridor. No wheezing, rhonchi or rales.   Skin:     General: Skin is warm.      Capillary Refill: Capillary refill takes less than 2 seconds.   Neurological:      General: No focal deficit present.      Mental Status: She is alert and oriented to person, place, and time.   Psychiatric:         Mood and Affect: Mood normal.         Behavior: Behavior normal.         Procedures           ED Course  ED Course as of 08/09/24 1533   Fri Aug 09, 2024   1532 Chest x-ray no pneumonia no mass no pleural effusions normal mediastinum no pneumothorax. [AR]      ED Course User Index  [AR] Zahida Marx MD                                           Medical Decision Making  Getting COVID flu strep swabs and a chest x-ray will place her on antibiotic and low-dose steroid.  Starting here.    COVID flu and strep test were negative.  The chest x-ray was negative.  You are given your first dose of antibiotic here and your next dose is due tomorrow morning a prescription for it was sent to your pharmacy.  You are given a small dose of steroid here today and your next dose is due tomorrow and a prescription has been sent to your pharmacy for this.  Please follow-up with your primary care doctor in a week.  If anything changes that concerns you you are welcome to return here.    She understood and agreed    Amount and/or Complexity of Data Reviewed  Radiology: ordered.    Risk  Prescription drug management.        Final diagnoses:   Bronchitis       ED Disposition  ED Disposition       ED Disposition   Discharge    Condition   Stable    Comment   --               Ana Pantoja, PARENEE  [address] [address]  [phone]    In 1 week  As needed         Medication List        New Prescriptions      cephalexin 500 MG capsule  Commonly known as: KEFLEX  Take 1 capsule by mouth 3 (Three) Times a Day for 5 days.     predniSONE 20 MG tablet  Commonly known as: DELTASONE  Take 1 tablet by mouth 2 (Two)  Times a Day for 5 days.            Stop      amoxicillin 500 MG capsule  Commonly known as: AMOXIL               Where to Get Your Medications        These medications were sent to [facility] [id number] - [place], [place] - [address] AT [facility] - [phone]  - [phone]   [address], [facility] [address]      Phone: [phone]   cephalexin 500 MG capsule  predniSONE 20 MG tablet

## 2024-08-14 ENCOUNTER — TREATMENT (OUTPATIENT)
Dept: PHYSICAL THERAPY | Facility: CLINIC | Age: 74
End: 2024-08-14
Payer: MEDICARE

## 2024-08-14 DIAGNOSIS — R26.81 GAIT INSTABILITY: Primary | ICD-10-CM

## 2024-08-14 NOTE — PROGRESS NOTES
Physical Therapy Daily Treatment Note  [address], [address]  [place] [address]      Patient: Arcelia Cole   : 1950  Referring practitioner: Dolly Rodriguez MD  Date of Initial Visit: Type: THERAPY  Noted: 2024  Today's Date: 2024  Patient seen for 4 sessions       Visit Diagnoses:    ICD-10-CM ICD-9-CM   1. Gait instability  R26.81 781.2           Subjective   Patient reports that she may be overdoing it with the HEP and with having to move.    Objective   See Exercise, Manual, and Modality Logs for complete treatment.       Assessment/Plan  Discussed with the patient that she may be overdoing it and instructed her to do the HEP 1 time a day 5 days a week, with the possibility of eventually doing every other day and she was agreeable to this.  Patient feels that she can continue with the HEP at this time and I am agreeable to that.  Patient was fatigued with the exercise routine, but had no reports of pain or discomfort.      Timed:         Manual Therapy:    0     mins  64660;     Therapeutic Exercise:    16     mins  81571;     Neuromuscular Yue:    0    mins  94849;    Therapeutic Activity:     8     mins  97321;     Gait Training      0    mins  24314;  Work Conditioning     0   mins  79665       Untimed:  Electrical Stimulation:    0     mins  13936 ( );      Timed Treatment:   24   mins   Total Treatment:     24   mins    Jules Rubio, PT  [place] License: [id number]

## 2024-09-11 ENCOUNTER — DOCUMENTATION (OUTPATIENT)
Dept: PHYSICAL THERAPY | Facility: CLINIC | Age: 74
End: 2024-09-11
Payer: MEDICARE

## 2024-09-11 NOTE — PROGRESS NOTES
Discharge Summary  Discharge Summary from Physical Therapy Report  [address]  [place] [address]    Patient Information  Arcelia Cole  1950    Dates  PT visit: 7/16 to 8/14/24  Number of Visits: 4     Discharge Status of Patient: See last daily note.    Goals: Partially Met    Visit Diagnoses:  No diagnosis found.    Discharge Plan: Continue with current home exercise program as instructed    Comments see above.    Date of Discharge 9/11/24        Jules Rubio, PT  Physical Therapist  [place] License [id number]

## 2024-09-11 NOTE — PROGRESS NOTES
Discharge Summary  Discharge Summary from Physical Therapy Report  [address]  [place] [address]    Patient Information  Arcelia Cole  1950    Dates  PT visit: 6/18 to 7/6/20  Number of Visits: 5     Discharge Status of Patient: See last daily note.    Goals: Partially Met    Visit Diagnoses:  No diagnosis found.    Discharge Plan: Continue with current home exercise program as instructed    Comments see above.    Date of Discharge 9/11/24 per chart review        Jules Rubio, PT  Physical Therapist  [place] License [id number]

## 2024-09-14 RX ORDER — LEVOTHYROXINE SODIUM 50 UG/1
TABLET ORAL
Qty: 90 TABLET | Refills: 1 | OUTPATIENT
Start: 2024-09-14

## 2024-09-14 RX ORDER — METFORMIN HCL 500 MG
TABLET, EXTENDED RELEASE 24 HR ORAL
Qty: 180 TABLET | Refills: 1 | OUTPATIENT
Start: 2024-09-14

## 2024-09-17 RX ORDER — METFORMIN HCL 500 MG
TABLET, EXTENDED RELEASE 24 HR ORAL
Qty: 60 TABLET | Refills: 0 | OUTPATIENT
Start: 2024-09-17

## 2024-09-17 RX ORDER — METFORMIN HCL 500 MG
TABLET, EXTENDED RELEASE 24 HR ORAL
Qty: 180 TABLET | Refills: 1 | OUTPATIENT
Start: 2024-09-17

## 2024-09-18 ENCOUNTER — OFFICE VISIT (OUTPATIENT)
Dept: FAMILY MEDICINE CLINIC | Facility: CLINIC | Age: 74
End: 2024-09-18
Payer: MEDICARE

## 2024-09-18 ENCOUNTER — TELEPHONE (OUTPATIENT)
Dept: FAMILY MEDICINE CLINIC | Facility: CLINIC | Age: 74
End: 2024-09-18

## 2024-09-18 VITALS
DIASTOLIC BLOOD PRESSURE: 80 MMHG | SYSTOLIC BLOOD PRESSURE: 119 MMHG | HEIGHT: 63 IN | WEIGHT: 167 LBS | OXYGEN SATURATION: 98 % | HEART RATE: 78 BPM | RESPIRATION RATE: 16 BRPM | BODY MASS INDEX: 29.59 KG/M2 | TEMPERATURE: 97.8 F

## 2024-09-18 DIAGNOSIS — E55.9 VITAMIN D DEFICIENCY: Chronic | ICD-10-CM

## 2024-09-18 DIAGNOSIS — E78.2 HYPERLIPIDEMIA, MIXED: Chronic | ICD-10-CM

## 2024-09-18 DIAGNOSIS — E03.9 HYPOTHYROIDISM, ACQUIRED: Chronic | ICD-10-CM

## 2024-09-18 DIAGNOSIS — E11.9 TYPE 2 DIABETES MELLITUS WITHOUT COMPLICATION, WITHOUT LONG-TERM CURRENT USE OF INSULIN: Chronic | ICD-10-CM

## 2024-09-18 DIAGNOSIS — I10 PRIMARY HYPERTENSION: Primary | Chronic | ICD-10-CM

## 2024-09-18 PROCEDURE — 1160F RVW MEDS BY RX/DR IN RCRD: CPT | Performed by: PHYSICIAN ASSISTANT

## 2024-09-18 PROCEDURE — 1126F AMNT PAIN NOTED NONE PRSNT: CPT | Performed by: PHYSICIAN ASSISTANT

## 2024-09-18 PROCEDURE — G0439 PPPS, SUBSEQ VISIT: HCPCS | Performed by: PHYSICIAN ASSISTANT

## 2024-09-18 PROCEDURE — 1170F FXNL STATUS ASSESSED: CPT | Performed by: PHYSICIAN ASSISTANT

## 2024-09-18 PROCEDURE — 1159F MED LIST DOCD IN RCRD: CPT | Performed by: PHYSICIAN ASSISTANT

## 2024-09-18 PROCEDURE — 3074F SYST BP LT 130 MM HG: CPT | Performed by: PHYSICIAN ASSISTANT

## 2024-09-18 PROCEDURE — 3079F DIAST BP 80-89 MM HG: CPT | Performed by: PHYSICIAN ASSISTANT

## 2024-09-18 PROCEDURE — 99213 OFFICE O/P EST LOW 20 MIN: CPT | Performed by: PHYSICIAN ASSISTANT

## 2024-09-18 PROCEDURE — 3044F HG A1C LEVEL LT 7.0%: CPT | Performed by: PHYSICIAN ASSISTANT

## 2024-09-18 RX ORDER — LEVOTHYROXINE SODIUM 50 UG/1
50 TABLET ORAL DAILY
Qty: 90 TABLET | Refills: 1 | Status: SHIPPED | OUTPATIENT
Start: 2024-09-18

## 2024-09-18 RX ORDER — LISINOPRIL 10 MG/1
10 TABLET ORAL DAILY
Qty: 90 TABLET | Refills: 1 | Status: SHIPPED | OUTPATIENT
Start: 2024-09-18

## 2024-09-18 RX ORDER — METFORMIN HCL 500 MG
1000 TABLET, EXTENDED RELEASE 24 HR ORAL DAILY
Qty: 180 TABLET | Refills: 1 | Status: SHIPPED | OUTPATIENT
Start: 2024-09-18

## 2024-09-18 RX ORDER — ROSUVASTATIN CALCIUM 5 MG/1
5 TABLET, COATED ORAL DAILY
Qty: 90 TABLET | Refills: 3 | Status: SHIPPED | OUTPATIENT
Start: 2024-09-18

## 2024-12-28 ENCOUNTER — APPOINTMENT (OUTPATIENT)
Dept: GENERAL RADIOLOGY | Facility: HOSPITAL | Age: 74
End: 2024-12-28
Payer: MEDICARE

## 2024-12-28 ENCOUNTER — APPOINTMENT (OUTPATIENT)
Dept: CT IMAGING | Facility: HOSPITAL | Age: 74
End: 2024-12-28
Payer: MEDICARE

## 2024-12-28 ENCOUNTER — HOSPITAL ENCOUNTER (EMERGENCY)
Facility: HOSPITAL | Age: 74
Discharge: HOME | End: 2024-12-28
Attending: EMERGENCY MEDICINE
Payer: MEDICARE

## 2024-12-28 VITALS
DIASTOLIC BLOOD PRESSURE: 86 MMHG | TEMPERATURE: 98.7 F | WEIGHT: 170 LBS | RESPIRATION RATE: 15 BRPM | HEIGHT: 63 IN | HEART RATE: 89 BPM | SYSTOLIC BLOOD PRESSURE: 139 MMHG | BODY MASS INDEX: 30.12 KG/M2 | OXYGEN SATURATION: 96 %

## 2024-12-28 DIAGNOSIS — U07.1 COVID-19: Primary | ICD-10-CM

## 2024-12-28 DIAGNOSIS — E04.1 THYROID NODULE: ICD-10-CM

## 2024-12-28 LAB
ALBUMIN SERPL-MCNC: 4.4 G/DL (ref 3.5–5.2)
ALBUMIN/GLOB SERPL: 1.6 G/DL
ALP SERPL-CCNC: 105 U/L (ref 39–117)
ALT SERPL W P-5'-P-CCNC: 41 U/L (ref 1–33)
ANION GAP SERPL CALCULATED.3IONS-SCNC: 13 MMOL/L (ref 5–15)
AST SERPL-CCNC: 39 U/L (ref 1–32)
BASOPHILS # BLD AUTO: 0.03 10*3/MM3 (ref 0–0.2)
BASOPHILS NFR BLD AUTO: 0.5 % (ref 0–1.5)
BILIRUB SERPL-MCNC: 0.3 MG/DL (ref 0–1.2)
BUN SERPL-MCNC: 10 MG/DL (ref 8–23)
BUN/CREAT SERPL: 16.4 (ref 7–25)
CALCIUM SPEC-SCNC: 8.7 MG/DL (ref 8.6–10.5)
CHLORIDE SERPL-SCNC: 92 MMOL/L (ref 98–107)
CO2 SERPL-SCNC: 22 MMOL/L (ref 22–29)
CREAT SERPL-MCNC: 0.61 MG/DL (ref 0.57–1)
DEPRECATED RDW RBC AUTO: 40.3 FL (ref 37–54)
EGFRCR SERPLBLD CKD-EPI 2021: 94.5 ML/MIN/1.73
EOSINOPHIL # BLD AUTO: 0.01 10*3/MM3 (ref 0–0.4)
EOSINOPHIL NFR BLD AUTO: 0.2 % (ref 0.3–6.2)
ERYTHROCYTE [DISTWIDTH] IN BLOOD BY AUTOMATED COUNT: 12.3 % (ref 12.3–15.4)
FLUAV SUBTYP SPEC NAA+PROBE: NOT DETECTED
FLUBV RNA ISLT QL NAA+PROBE: NOT DETECTED
GLOBULIN UR ELPH-MCNC: 2.7 GM/DL
GLUCOSE SERPL-MCNC: 163 MG/DL (ref 65–99)
HCT VFR BLD AUTO: 42.1 % (ref 34–46.6)
HGB BLD-MCNC: 14 G/DL (ref 12–15.9)
IMM GRANULOCYTES # BLD AUTO: 0.06 10*3/MM3 (ref 0–0.05)
IMM GRANULOCYTES NFR BLD AUTO: 0.9 % (ref 0–0.5)
LYMPHOCYTES # BLD AUTO: 0.79 10*3/MM3 (ref 0.7–3.1)
LYMPHOCYTES NFR BLD AUTO: 12.2 % (ref 19.6–45.3)
MAGNESIUM SERPL-MCNC: 2 MG/DL (ref 1.6–2.4)
MCH RBC QN AUTO: 29.4 PG (ref 26.6–33)
MCHC RBC AUTO-ENTMCNC: 33.3 G/DL (ref 31.5–35.7)
MCV RBC AUTO: 88.3 FL (ref 79–97)
MONOCYTES # BLD AUTO: 0.82 10*3/MM3 (ref 0.1–0.9)
MONOCYTES NFR BLD AUTO: 12.6 % (ref 5–12)
NEUTROPHILS NFR BLD AUTO: 4.78 10*3/MM3 (ref 1.7–7)
NEUTROPHILS NFR BLD AUTO: 73.6 % (ref 42.7–76)
PLATELET # BLD AUTO: 281 10*3/MM3 (ref 140–450)
PMV BLD AUTO: 10.5 FL (ref 6–12)
POTASSIUM SERPL-SCNC: 4.8 MMOL/L (ref 3.5–5.2)
PROT SERPL-MCNC: 7.1 G/DL (ref 6–8.5)
RBC # BLD AUTO: 4.77 10*6/MM3 (ref 3.77–5.28)
SARS-COV-2 RNA RESP QL NAA+PROBE: DETECTED
SODIUM SERPL-SCNC: 127 MMOL/L (ref 136–145)
TSH SERPL DL<=0.05 MIU/L-ACNC: 1.47 UIU/ML (ref 0.27–4.2)
WBC NRBC COR # BLD AUTO: 6.49 10*3/MM3 (ref 3.4–10.8)

## 2024-12-28 PROCEDURE — 85025 COMPLETE CBC W/AUTO DIFF WBC: CPT | Performed by: EMERGENCY MEDICINE

## 2024-12-28 PROCEDURE — 71046 X-RAY EXAM CHEST 2 VIEWS: CPT

## 2024-12-28 PROCEDURE — 96361 HYDRATE IV INFUSION ADD-ON: CPT

## 2024-12-28 PROCEDURE — 25510000001 IOPAMIDOL PER 1 ML: Performed by: EMERGENCY MEDICINE

## 2024-12-28 PROCEDURE — 25810000003 SODIUM CHLORIDE 0.9 % SOLUTION: Performed by: EMERGENCY MEDICINE

## 2024-12-28 PROCEDURE — 80053 COMPREHEN METABOLIC PANEL: CPT | Performed by: EMERGENCY MEDICINE

## 2024-12-28 PROCEDURE — 99285 EMERGENCY DEPT VISIT HI MDM: CPT

## 2024-12-28 PROCEDURE — 87636 SARSCOV2 & INF A&B AMP PRB: CPT | Performed by: EMERGENCY MEDICINE

## 2024-12-28 PROCEDURE — 71260 CT THORAX DX C+: CPT

## 2024-12-28 PROCEDURE — 84443 ASSAY THYROID STIM HORMONE: CPT | Performed by: EMERGENCY MEDICINE

## 2024-12-28 PROCEDURE — 96360 HYDRATION IV INFUSION INIT: CPT

## 2024-12-28 PROCEDURE — 83735 ASSAY OF MAGNESIUM: CPT | Performed by: EMERGENCY MEDICINE

## 2024-12-28 RX ORDER — IOPAMIDOL 755 MG/ML
100 INJECTION, SOLUTION INTRAVASCULAR
Status: COMPLETED | OUTPATIENT
Start: 2024-12-28 | End: 2024-12-28

## 2024-12-28 RX ORDER — DEXTROMETHORPHAN HYDROBROMIDE AND PROMETHAZINE HYDROCHLORIDE 15; 6.25 MG/5ML; MG/5ML
5 SYRUP ORAL 3 TIMES DAILY PRN
Qty: 80 ML | Refills: 0 | Status: SHIPPED | OUTPATIENT
Start: 2024-12-28 | End: 2025-01-03

## 2024-12-28 RX ADMIN — IOPAMIDOL 100 ML: 755 INJECTION, SOLUTION INTRAVENOUS at 13:26

## 2024-12-28 RX ADMIN — SODIUM CHLORIDE 1000 ML: 9 INJECTION, SOLUTION INTRAVENOUS at 12:56

## 2024-12-28 NOTE — Clinical Note
[facility]  [address]  [facility] [address]    Arcelia Cole was seen and treated in our emergency department on 12/28/2024.  She may return to work on 01/03/2025.  Return on above date or before if symptom free.       Thank you for choosing [facility].    Tamra Mcguire RN

## 2024-12-28 NOTE — DISCHARGE INSTRUCTIONS
Today you were slightly dehydrated and you have been found to have COVID.  I did send in the antiviral Paxlovid to your pharmacy.  Take as directed    You do also have an enlarged thyroid nodule on the right side of your thyroid.  I am going to order an outpatient ultrasound.  The  department should contact you on Monday or Tuesday and help set this up.  The results will go to your primary physician for review    He will be important to continue Tylenol every 6 hours for fever control.  Please rest and make sure to maintain adequate fluid hydration    Please read all of the instructions in this handout.  If you receive prescriptions please fill them and take them as directed.  Please call your primary care physician for follow-up appointment in the next 5 to 7 days.  If you do not have a physician you may call the [facility] referral line at [phone].    You may return to the emergency department at any time for any concerns such as worsening symptoms.  If you received a work or school note it will be printed at the back of this packet.

## 2024-12-28 NOTE — [facility] PROVIDER NOTE
EMERGENCY DEPARTMENT ENCOUNTER    Room Number:  04/04  Date seen:  12/28/2024  Time seen: 12:31 EST  PCP: Ana Pantoja PA-C  Historian:     Discussed/obtained information from independent historians:     HPI:    Patient is a 74-year-old female.  She presents with a 2 to 3-week history of nonproductive cough, generalized weakness, decreased p.o. intake.  She denies any fever or chills.  No sick contacts.  No significant sputum production.  No associated vomiting or diarrhea    External (non-ED) record review:        Chronic or social conditions impacting care:    ALLERGIES  Patient has no known allergies.    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    Hallux valgus of right foot 12/19/2019    Borderline abnormal thyroid function test 03/21/2023    Type 2 diabetes mellitus without complication, without long-term current use of insulin 03/21/2023    Primary hypertension 05/26/2023     Resolved Ambulatory Problems     Diagnosis Date Noted    Chest pain 01/28/2020     Past Medical History:   Diagnosis Date    Arthritis 2020    Hypertension 2023    Menopause        PAST SURGICAL HISTORY  Past Surgical History:   Procedure Laterality Date    APPENDECTOMY  1961    COLONOSCOPY  2019    MANDIBLE SURGERY      WRIST SURGERY         FAMILY HISTORY  Family History   Problem Relation Age of Onset    Diabetes Mother     Heart disease Mother     Diabetes Father     No Known Problems Sister     No Known Problems Brother     Alcohol abuse Daughter         44    No Known Problems Son     Cancer Maternal Grandmother         BLADDER     No Known Problems Paternal Grandmother     No Known Problems Maternal Grandfather     No Known Problems Paternal  Grandfather     Breast cancer Neg Hx        SOCIAL HISTORY  Social History     Socioeconomic History    Marital status:    Tobacco Use    Smoking status: Never     Passive exposure: Never    Smokeless tobacco: Never   Vaping Use    Vaping status: Never Used   Substance and Sexual Activity    Alcohol use: Yes     Alcohol/week: 1.0 standard drink of alcohol     Types: 1 Glasses of wine per week     Comment: occasional    Drug use: Never       REVIEW OF SYSTEMS  Review of Systems    All systems reviewed and negative except for those discussed in HPI.       PHYSICAL EXAM    I have reviewed the triage vital signs and nursing notes.  Vitals:    12/28/24 1041   BP: 139/86   Pulse: 89   Resp: 15   Temp: 98.7 °F (37.1 °C)   SpO2: 96%     Physical Exam  Vital signs: Reviewed in nurses notes    General: Awake alert.  She does appear to feel poorly but is nontoxic    HEENT: Normocephalic atraumatic nasopharynx slightly congested Prideaux pharynx is clear with some dry mucous membranes noted    Neck:   Supple, no elevation of JVD    Respiratory:   Nonlabored respirations.  Clear to auscultation bilaterally.  Equal breath sounds bilaterally.  No wheezes or stridor noted.    Cardiovascular: Regular rate and rhythm.  No murmur.  Equal pulses in bilateral lower extremities without edema.    Abdomen: Nondistended    Skin:   Warm and dry.  No rashes noted    Neurological examination: Patient is awake alert oriented x4.  Speech is normal.  No facial palsy.  No focal motor or sensory deficits.    LAB RESULTS  Recent Results (from the past 24 hours)   COVID-19 and FLU A/B PCR, 1 HR TAT - Swab, Nasopharynx    Collection Time: 12/28/24 12:36 PM    Specimen: Nasopharynx; Swab   Result Value Ref Range    COVID19 Detected (C) Not Detected - Ref. Range    Influenza A PCR Not Detected Not Detected    Influenza B PCR Not Detected Not Detected   Comprehensive Metabolic Panel    Collection Time: 12/28/24 12:36 PM    Specimen: Blood   Result  Value Ref Range    Glucose 163 (H) 65 - 99 mg/dL    BUN 10 8 - 23 mg/dL    Creatinine 0.61 0.57 - 1.00 mg/dL    Sodium 127 (L) 136 - 145 mmol/L    Potassium 4.8 3.5 - 5.2 mmol/L    Chloride 92 (L) 98 - 107 mmol/L    CO2 22.0 22.0 - 29.0 mmol/L    Calcium 8.7 8.6 - 10.5 mg/dL    Total Protein 7.1 6.0 - 8.5 g/dL    Albumin 4.4 3.5 - 5.2 g/dL    ALT (SGPT) 41 (H) 1 - 33 U/L    AST (SGOT) 39 (H) 1 - 32 U/L    Alkaline Phosphatase 105 39 - 117 U/L    Total Bilirubin 0.3 0.0 - 1.2 mg/dL    Globulin 2.7 gm/dL    A/G Ratio 1.6 g/dL    BUN/Creatinine Ratio 16.4 7.0 - 25.0    Anion Gap 13.0 5.0 - 15.0 mmol/L    eGFR 94.5 >60.0 mL/min/1.73   Magnesium    Collection Time: 12/28/24 12:36 PM    Specimen: Blood   Result Value Ref Range    Magnesium 2.0 1.6 - 2.4 mg/dL   TSH Rfx On Abnormal To Free T4    Collection Time: 12/28/24 12:36 PM    Specimen: Blood   Result Value Ref Range    TSH 1.470 0.270 - 4.200 uIU/mL   CBC Auto Differential    Collection Time: 12/28/24 12:36 PM    Specimen: Blood   Result Value Ref Range    WBC 6.49 3.40 - 10.80 10*3/mm3    RBC 4.77 3.77 - 5.28 10*6/mm3    Hemoglobin 14.0 12.0 - 15.9 g/dL    Hematocrit 42.1 34.0 - 46.6 %    MCV 88.3 79.0 - 97.0 fL    MCH 29.4 26.6 - 33.0 pg    MCHC 33.3 31.5 - 35.7 g/dL    RDW 12.3 12.3 - 15.4 %    RDW-SD 40.3 37.0 - 54.0 fl    MPV 10.5 6.0 - 12.0 fL    Platelets 281 140 - 450 10*3/mm3    Neutrophil % 73.6 42.7 - 76.0 %    Lymphocyte % 12.2 (L) 19.6 - 45.3 %    Monocyte % 12.6 (H) 5.0 - 12.0 %    Eosinophil % 0.2 (L) 0.3 - 6.2 %    Basophil % 0.5 0.0 - 1.5 %    Immature Grans % 0.9 (H) 0.0 - 0.5 %    Neutrophils, Absolute 4.78 1.70 - 7.00 10*3/mm3    Lymphocytes, Absolute 0.79 0.70 - 3.10 10*3/mm3    Monocytes, Absolute 0.82 0.10 - 0.90 10*3/mm3    Eosinophils, Absolute 0.01 0.00 - 0.40 10*3/mm3    Basophils, Absolute 0.03 0.00 - 0.20 10*3/mm3    Immature Grans, Absolute 0.06 (H) 0.00 - 0.05 10*3/mm3       Ordered the above labs and independently interpreted results.   My findings will be discussed in the ED course or medical decision making section below      PROCEDURES:  Procedures      RADIOLOGY RESULTS  CT Chest With Contrast Diagnostic    Result Date: 12/28/2024  CT CHEST W CONTRAST DIAGNOSTIC-  INDICATIONS: Cough, tracheal deviation  TECHNIQUE: Radiation dose reduction techniques were utilized, including automated exposure control and exposure modulation based on body size. ENHANCED CHEST CT  COMPARISON: Correlated with x-ray from same date  FINDINGS:  The heart size is normal without pericardial effusion. No prominent coronary arterial calcifications. A few small subcentimeter short axis mediastinal lymph nodes are seen that are not significant by size criteria.  Asymmetric enlargement of the right thyroid lobe contributes to previous radiographically identified leftward tracheal deviation, and may reflect presence of an underlying thyroid nodule, ultrasound correlation advised as indicated. A hypodense nodule is seen more superiorly in the right thyroid lobe, only partly included.  The airways appear clear.  No pleural effusion or pneumothorax.  The lungs show no focal pulmonary consolidation or mass.  Upper abdominal structures show no acute findings. Relative low-density of the liver suggests steatosis.  Degenerative changes are seen in the spine, shoulders. No acute fracture is identified.       Right thyroid nodularity and asymmetric enlargement, contributing to leftward tracheal deviation, ultrasound correlation advised as indicated.  This report was finalized on 12/28/2024 2:05 PM by Dr. Kostas Aquino M.D on Workstation: [id number]      XR Chest 2 View    Result Date: 12/28/2024  XR CHEST 2 VW-  CLINICAL HISTORY:  cough fatigue x2 weeks  COMPARISON: 8/9/2024  FINDINGS: PA and lateral views of the chest demonstrates the heart to be within normal limits in size. Mild bibasilar atelectasis/infiltrate is appreciated similar in appearance as compared to prior  examination with no evidence of consolidation, effusion or of congestive failure. Tracheal deviation to the left is noted which may be secondary to adjacent vasculature or potentially a thyroid mass. Further evaluation could be performed with a CT examination of the chest. Moderate to severe degenerative disease involving the shoulders bilaterally is appreciated.    This report was finalized on 12/28/2024 12:04 PM by Dr. Joseph Bansal M.D on Workstation: [id number]        Ordered the above noted radiological studies.  Independently interpreted by me.  My findings will be discussed in the medical decision section below.     PROGRESS, DATA ANALYSIS, CONSULTS AND MEDICAL DECISION MAKING    Please note that this section constitutes my independent interpretation of clinical data including lab results, radiology, EKG's.  This constitutes my independent professional opinion regarding differential diagnosis and management of this patient.  It may include any factors such as history from outside sources, review of external records, social determinants of health, management of medications, response to those treatments, and discussions with other providers.    ED Course as of 12/28/24 1441   Sat Dec 28, 2024   1424 Patient noted to be COVID-positive.  She has been vaccinated but feels poorly.  She has been rehydrated here.    Will proceed with paxlovid and order outpatient us of thyroid. [TC]      ED Course User Index  [TC] Loc Boyle MD     Orders placed during this visit:  Orders Placed This Encounter   Procedures    COVID-19 and FLU A/B PCR, 1 HR TAT - Swab, Nasopharynx    XR Chest 2 View    CT Chest With Contrast Diagnostic    US thyroid    Comprehensive Metabolic Panel    Magnesium    TSH Rfx On Abnormal To Free T4    CBC Auto Differential    CBC & Differential    ED Acknowledgement Form Needed;       Medications   sodium chloride 0.9 % bolus 1,000 mL (1,000 mL Intravenous New Bag 12/28/24 1256)   iopamidol  (ISOVUE-370) 76 % injection 100 mL (100 mL Intravenous Given 12/28/24 1326)            Medical Decision Making          DIAGNOSIS  Final diagnoses:   COVID-19   Thyroid nodule          Medication List        New Prescriptions      Nirmatrelvir & Ritonavir (300mg/100mg)  Commonly known as: PAXLOVID  Take 3 tablets by mouth 2 (Two) Times a Day for 5 days.     promethazine-dextromethorphan 6.25-15 MG/5ML syrup  Commonly known as: PROMETHAZINE-DM  Take 5 mL by mouth 3 (Three) Times a Day As Needed for Cough for up to 6 days.               Where to Get Your Medications        These medications were sent to [facility] [id number] - [place], [place] - [address] [facility] - [phone]  - [phone]   [address], [place] [id number]      Phone: [phone]   Nirmatrelvir & Ritonavir (300mg/100mg)  promethazine-dextromethorphan 6.25-15 MG/5ML syrup         FOLLOW-UP  Ana Pantoja PA-C  [address] [facility] [address]  [place] [id number] [phone]    In 1 week          Latest Documented Vital Signs:  As of 14:41 EST  BP- 139/86 HR- 89 Temp- 98.7 °F (37.1 °C) (Tympanic) O2 sat- 96%    Appropriate PPE utilized throughout this patient encounter to include mask, if indicated, per current protocol. Hand hygiene was performed before donning PPE and after removal when leaving the room.    Please note that portions of this were completed with a voice recognition program.     Note Disclaimer: At [facility], we believe that sharing information builds trust and better relationships. You are receiving this note because you are receiving care at [facility] or recently visited. It is possible you will see health information before a provider has talked with you about it. This kind of information can be easy to misunderstand. To help you fully understand what it means for your health, we urge you to discuss this note with your provider.

## 2024-12-29 DIAGNOSIS — E04.1 THYROID NODULE: Primary | ICD-10-CM

## 2025-02-20 ENCOUNTER — OFFICE VISIT (OUTPATIENT)
Dept: FAMILY MEDICINE CLINIC | Facility: CLINIC | Age: 75
End: 2025-02-20
Payer: MEDICARE

## 2025-02-20 VITALS
RESPIRATION RATE: 16 BRPM | SYSTOLIC BLOOD PRESSURE: 130 MMHG | WEIGHT: 173 LBS | HEIGHT: 63 IN | DIASTOLIC BLOOD PRESSURE: 84 MMHG | OXYGEN SATURATION: 99 % | BODY MASS INDEX: 30.65 KG/M2 | TEMPERATURE: 98.1 F | HEART RATE: 92 BPM

## 2025-02-20 DIAGNOSIS — E04.1 THYROID NODULE: ICD-10-CM

## 2025-02-20 DIAGNOSIS — B07.0 PLANTAR WART OF RIGHT FOOT: Primary | ICD-10-CM

## 2025-02-20 DIAGNOSIS — E55.9 VITAMIN D DEFICIENCY: ICD-10-CM

## 2025-02-20 DIAGNOSIS — M25.561 CHRONIC PAIN OF BOTH KNEES: ICD-10-CM

## 2025-02-20 DIAGNOSIS — E11.9 TYPE 2 DIABETES MELLITUS WITHOUT COMPLICATION, WITHOUT LONG-TERM CURRENT USE OF INSULIN: ICD-10-CM

## 2025-02-20 DIAGNOSIS — E78.2 HYPERLIPIDEMIA, MIXED: ICD-10-CM

## 2025-02-20 DIAGNOSIS — G89.29 CHRONIC PAIN OF BOTH KNEES: ICD-10-CM

## 2025-02-20 DIAGNOSIS — E03.9 HYPOTHYROIDISM, ACQUIRED: ICD-10-CM

## 2025-02-20 DIAGNOSIS — I10 PRIMARY HYPERTENSION: ICD-10-CM

## 2025-02-20 DIAGNOSIS — F34.1 DYSTHYMIA: ICD-10-CM

## 2025-02-20 DIAGNOSIS — M25.562 CHRONIC PAIN OF BOTH KNEES: ICD-10-CM

## 2025-02-20 PROCEDURE — 1126F AMNT PAIN NOTED NONE PRSNT: CPT | Performed by: PHYSICIAN ASSISTANT

## 2025-02-20 PROCEDURE — 99214 OFFICE O/P EST MOD 30 MIN: CPT | Performed by: PHYSICIAN ASSISTANT

## 2025-02-20 PROCEDURE — 1160F RVW MEDS BY RX/DR IN RCRD: CPT | Performed by: PHYSICIAN ASSISTANT

## 2025-02-20 PROCEDURE — 3075F SYST BP GE 130 - 139MM HG: CPT | Performed by: PHYSICIAN ASSISTANT

## 2025-02-20 PROCEDURE — 1159F MED LIST DOCD IN RCRD: CPT | Performed by: PHYSICIAN ASSISTANT

## 2025-02-20 PROCEDURE — 3079F DIAST BP 80-89 MM HG: CPT | Performed by: PHYSICIAN ASSISTANT

## 2025-02-20 NOTE — PROGRESS NOTES
"Subjective   Arcelia Cole is a 74 y.o. female.     History of Present Illness   Arcelia Cole 74 y.o. female /84   Pulse 92   Temp 98.1 °F (36.7 °C) (Temporal)   Resp 16   Ht 160 cm (63\")   Wt 78.5 kg (173 lb)   LMP  (LMP Unknown)   SpO2 99%   BMI 30.65 kg/m²  who presents today for lesion on her toe ---Tip 3rd toe--2 1/2 weeks --sore---cannot wear shoes  White lesion, black dot  No d/C  My concern also is that she has type 2 diabetes  She will be due for labs and follow-up on her type 2 diabetes, mixed hyperlipidemia, hypothyroidism, vitamin D deficiency in 1 month  Also continued grief and low mood and want to discuss starting Zoloft  Also did not see me for hospital follow-up from the ER and had abnormal finding from ER visit.  I made notes below and reviewed all of her ER notes and included labs do want to follow-up on the sodium level and proceed with the thyroid ultrasound that was recommended to her at the ER visit and will make new order.  Also concerned with both knees progressing with arthritis pain and last x-rays were over a year ago.   has a history of   Patient Active Problem List   Diagnosis    Psoriasis    Hypertriglyceridemia    Impaired fasting glucose    Vitamin D deficiency    Hypothyroidism, acquired    Hyperlipidemia, mixed    Metatarsus adductus    Hallux valgus of right foot    Borderline abnormal thyroid function test    Type 2 diabetes mellitus without complication, without long-term current use of insulin    Primary hypertension   .        . /88   Pulse 92   Temp 98.1 °F (36.7 °C) (Temporal)   Resp 16   Ht 160 cm (63\")   Wt 78.5 kg (173 lb)   LMP  (LMP Unknown)   SpO2 99%   BMI 30.65 kg/m² .        Sees DR Johnson for knee pain ---gel injections and steroid injections.  Has been to PT.  Still doing exercises    Results for orders placed or performed during the hospital encounter of 12/28/24   COVID-19 and FLU A/B PCR, 1 HR TAT - Swab, Nasopharynx    " Collection Time: 12/28/24 12:36 PM    Specimen: Nasopharynx; Swab   Result Value Ref Range    COVID19 Detected (C) Not Detected - Ref. Range    Influenza A PCR Not Detected Not Detected    Influenza B PCR Not Detected Not Detected   Comprehensive Metabolic Panel    Collection Time: 12/28/24 12:36 PM    Specimen: Blood   Result Value Ref Range    Glucose 163 (H) 65 - 99 mg/dL    BUN 10 8 - 23 mg/dL    Creatinine 0.61 0.57 - 1.00 mg/dL    Sodium 127 (L) 136 - 145 mmol/L    Potassium 4.8 3.5 - 5.2 mmol/L    Chloride 92 (L) 98 - 107 mmol/L    CO2 22.0 22.0 - 29.0 mmol/L    Calcium 8.7 8.6 - 10.5 mg/dL    Total Protein 7.1 6.0 - 8.5 g/dL    Albumin 4.4 3.5 - 5.2 g/dL    ALT (SGPT) 41 (H) 1 - 33 U/L    AST (SGOT) 39 (H) 1 - 32 U/L    Alkaline Phosphatase 105 39 - 117 U/L    Total Bilirubin 0.3 0.0 - 1.2 mg/dL    Globulin 2.7 gm/dL    A/G Ratio 1.6 g/dL    BUN/Creatinine Ratio 16.4 7.0 - 25.0    Anion Gap 13.0 5.0 - 15.0 mmol/L    eGFR 94.5 >60.0 mL/min/1.73   Magnesium    Collection Time: 12/28/24 12:36 PM    Specimen: Blood   Result Value Ref Range    Magnesium 2.0 1.6 - 2.4 mg/dL   TSH Rfx On Abnormal To Free T4    Collection Time: 12/28/24 12:36 PM    Specimen: Blood   Result Value Ref Range    TSH 1.470 0.270 - 4.200 uIU/mL   CBC Auto Differential    Collection Time: 12/28/24 12:36 PM    Specimen: Blood   Result Value Ref Range    WBC 6.49 3.40 - 10.80 10*3/mm3    RBC 4.77 3.77 - 5.28 10*6/mm3    Hemoglobin 14.0 12.0 - 15.9 g/dL    Hematocrit 42.1 34.0 - 46.6 %    MCV 88.3 79.0 - 97.0 fL    MCH 29.4 26.6 - 33.0 pg    MCHC 33.3 31.5 - 35.7 g/dL    RDW 12.3 12.3 - 15.4 %    RDW-SD 40.3 37.0 - 54.0 fl    MPV 10.5 6.0 - 12.0 fL    Platelets 281 140 - 450 10*3/mm3    Neutrophil % 73.6 42.7 - 76.0 %    Lymphocyte % 12.2 (L) 19.6 - 45.3 %    Monocyte % 12.6 (H) 5.0 - 12.0 %    Eosinophil % 0.2 (L) 0.3 - 6.2 %    Basophil % 0.5 0.0 - 1.5 %    Immature Grans % 0.9 (H) 0.0 - 0.5 %    Neutrophils, Absolute 4.78 1.70 - 7.00  10*3/mm3    Lymphocytes, Absolute 0.79 0.70 - 3.10 10*3/mm3    Monocytes, Absolute 0.82 0.10 - 0.90 10*3/mm3    Eosinophils, Absolute 0.01 0.00 - 0.40 10*3/mm3    Basophils, Absolute 0.03 0.00 - 0.20 10*3/mm3    Immature Grans, Absolute 0.06 (H) 0.00 - 0.05 10*3/mm3   CT chest 12-28-24  Narrative & Impression   CT CHEST W CONTRAST DIAGNOSTIC-     INDICATIONS: Cough, tracheal deviation     TECHNIQUE: Radiation dose reduction techniques were utilized, including  automated exposure control and exposure modulation based on body size.  ENHANCED CHEST CT     COMPARISON: Correlated with x-ray from same date     FINDINGS:     The heart size is normal without pericardial effusion. No prominent  coronary arterial calcifications. A few small subcentimeter short axis  mediastinal lymph nodes are seen that are not significant by size  criteria.     Asymmetric enlargement of the right thyroid lobe contributes to previous  radiographically identified leftward tracheal deviation, and may reflect  presence of an underlying thyroid nodule, ultrasound correlation advised  as indicated. A hypodense nodule is seen more superiorly in the right  thyroid lobe, only partly included.     The airways appear clear.     No pleural effusion or pneumothorax.     The lungs show no focal pulmonary consolidation or mass.     Upper abdominal structures show no acute findings. Relative low-density  of the liver suggests steatosis.     Degenerative changes are seen in the spine, shoulders. No acute fracture  is identified.     IMPRESSION:     Right thyroid nodularity and asymmetric enlargement, contributing to  leftward tracheal deviation, ultrasound correlation advised as  indicated.       She went to the [facility] ER 12/28/2024 diagnosed with COVID and given Paxlovid but also had labs    Tip 3rd toe--2 1/2 weeks --sore---cannot wear shoes  White lesion, black dot  No d/C    Ultravate cream for her psoriasis works well as needed     Arcelia Cole  "female 74 y.o., /84   Pulse 92   Temp 98.1 °F (36.7 °C) (Temporal)   Resp 16   Ht 160 cm (63\")   Wt 78.5 kg (173 lb)   LMP  (LMP Unknown)   SpO2 99%   BMI 30.65 kg/m²   who presents today for new evaluation and treatment of Depression and grief .  She reports depressed mood, anhedonia, and impaired concentration. Onset of symptoms was approximately 1 year ago. She denies current suicidal and homicidal ideation. Risk factors are lifestyle of multiple roles and grief.  Previous treatment includes  several years ago when her daughter  she took Zoloft and it did help . She complains of the following medication side effects:none. The patient agrees to start counseling..  Took Zoloft in past  Lab Results   Component Value Date    HGBA1C 7.60 (H) 2024   \    The following portions of the patient's history were reviewed and updated as appropriate: allergies, current medications, past family history, past medical history, past social history, past surgical history, and problem list.      Review of Systems   Constitutional:  Negative for diaphoresis.   HENT:  Negative for nosebleeds and trouble swallowing.    Eyes:  Negative for blurred vision and visual disturbance.   Respiratory:  Negative for choking.    Gastrointestinal:  Negative for blood in stool.   Skin:  Positive for skin lesions.   Allergic/Immunologic: Negative for immunocompromised state.   Neurological:  Negative for facial asymmetry and speech difficulty.   Psychiatric/Behavioral:  Positive for dysphoric mood and depressed mood. Negative for self-injury and suicidal ideas.        Objective   Physical Exam  Vitals and nursing note reviewed.   Constitutional:       General: She is not in acute distress.     Appearance: She is well-developed. She is not ill-appearing or toxic-appearing.   HENT:      Head: Normocephalic.      Right Ear: External ear normal.      Left Ear: External ear normal.      Nose: Nose normal.      Mouth/Throat:      " Pharynx: Oropharynx is clear.   Eyes:      General: No scleral icterus.     Conjunctiva/sclera: Conjunctivae normal.      Pupils: Pupils are equal, round, and reactive to light.   Neck:      Thyroid: No thyromegaly.   Cardiovascular:      Rate and Rhythm: Normal rate and regular rhythm.      Heart sounds: Normal heart sounds. No murmur heard.  Pulmonary:      Effort: Pulmonary effort is normal. No respiratory distress.      Breath sounds: Normal breath sounds.   Musculoskeletal:         General: Tenderness present. No deformity. Normal range of motion.      Cervical back: Normal range of motion and neck supple.   Skin:     General: Skin is warm and dry.      Findings: Lesion present. No rash.      Comments: Tip of the third right toe with plantar wart like white circular papular area less than a millimeter in depth with black center----the area is callused and difficult see if there is more than 1 black dot and is tender   Neurological:      General: No focal deficit present.      Mental Status: She is alert and oriented to person, place, and time. Mental status is at baseline.   Psychiatric:         Behavior: Behavior normal.         Thought Content: Thought content normal.         Judgment: Judgment normal.      Comments: tearful           Assessment & Plan   Diagnoses and all orders for this visit:    1. Plantar wart of right foot (Primary)  -     Ambulatory Referral to Podiatry    2. Type 2 diabetes mellitus without complication, without long-term current use of insulin  Comments:  Concerned that she has a lesion on her foot with her type 2 diabetes  Orders:  -     Ambulatory Referral to Podiatry    3. Chronic pain of both knees  -     XR Knee 1 or 2 View Bilateral; Future  -     XR Knee 1 or 2 View Bilateral    4. Thyroid nodule  Comments:  Incidental finding from ER visit needs ultrasound  Orders:  -     US Thyroid    5. Dysthymia    Other orders  -     sertraline (Zoloft) 50 MG tablet; 1/2 tab PO daily for 4  days then one PO daily for stress  Dispense: 30 tablet; Refill: 1      She is due for routine labs for the diabetes I am concerned her sodium was a bit low on 12/28/2024 labs we will make orders to update  US thyroid ---nodular right--- I will place a new order due to the nodularity right sided thyroid noted on CT from ER 12/28/2024  So increased knee pain will get knee x-rays bilateral to evaluate how much bone-on-bone osteoarthritis she has and may need to see Dr Hardy.  Start  Zoloft----for depression and grief and follow-up with me in a month  Due to update all labs also following up on the low sodium from the ER    Made referral to podiatry for the plantar wart right tip third toe causing pain and patient is type 2 diabetes---  She also has primary hypertension I rechecked blood pressure and it is in acceptable range

## 2025-02-21 LAB
25(OH)D3+25(OH)D2 SERPL-MCNC: 54.4 NG/ML (ref 30–100)
ALBUMIN SERPL-MCNC: 4.8 G/DL (ref 3.8–4.8)
ALP SERPL-CCNC: 107 IU/L (ref 44–121)
ALT SERPL-CCNC: 32 IU/L (ref 0–32)
APPEARANCE UR: CLEAR
AST SERPL-CCNC: 20 IU/L (ref 0–40)
BACTERIA #/AREA URNS HPF: NORMAL /[HPF]
BASOPHILS # BLD AUTO: 0.1 X10E3/UL (ref 0–0.2)
BASOPHILS NFR BLD AUTO: 1 %
BILIRUB SERPL-MCNC: 0.3 MG/DL (ref 0–1.2)
BILIRUB UR QL STRIP: NEGATIVE
BUN SERPL-MCNC: 10 MG/DL (ref 8–27)
BUN/CREAT SERPL: 13 (ref 12–28)
CALCIUM SERPL-MCNC: 10.3 MG/DL (ref 8.7–10.3)
CASTS URNS QL MICRO: NORMAL /LPF
CHLORIDE SERPL-SCNC: 101 MMOL/L (ref 96–106)
CHOLEST SERPL-MCNC: 169 MG/DL (ref 100–199)
CO2 SERPL-SCNC: 22 MMOL/L (ref 20–29)
COLOR UR: YELLOW
CREAT SERPL-MCNC: 0.77 MG/DL (ref 0.57–1)
EGFRCR SERPLBLD CKD-EPI 2021: 81 ML/MIN/1.73
EOSINOPHIL # BLD AUTO: 0.2 X10E3/UL (ref 0–0.4)
EOSINOPHIL NFR BLD AUTO: 2 %
EPI CELLS #/AREA URNS HPF: NORMAL /HPF (ref 0–10)
ERYTHROCYTE [DISTWIDTH] IN BLOOD BY AUTOMATED COUNT: 13.2 % (ref 11.7–15.4)
FOLATE SERPL-MCNC: >20 NG/ML
GLOBULIN SER CALC-MCNC: 2.3 G/DL (ref 1.5–4.5)
GLUCOSE SERPL-MCNC: 102 MG/DL (ref 70–99)
GLUCOSE UR QL STRIP: NEGATIVE
HBA1C MFR BLD: 7.8 % (ref 4.8–5.6)
HCT VFR BLD AUTO: 43 % (ref 34–46.6)
HDLC SERPL-MCNC: 87 MG/DL
HGB BLD-MCNC: 14.3 G/DL (ref 11.1–15.9)
HGB UR QL STRIP: NEGATIVE
IMM GRANULOCYTES # BLD AUTO: 0.1 X10E3/UL (ref 0–0.1)
IMM GRANULOCYTES NFR BLD AUTO: 1 %
KETONES UR QL STRIP: NEGATIVE
LDLC SERPL CALC-MCNC: 59 MG/DL (ref 0–99)
LEUKOCYTE ESTERASE UR QL STRIP: ABNORMAL
LYMPHOCYTES # BLD AUTO: 2.4 X10E3/UL (ref 0.7–3.1)
LYMPHOCYTES NFR BLD AUTO: 24 %
MCH RBC QN AUTO: 29.5 PG (ref 26.6–33)
MCHC RBC AUTO-ENTMCNC: 33.3 G/DL (ref 31.5–35.7)
MCV RBC AUTO: 89 FL (ref 79–97)
MICRO URNS: ABNORMAL
MICROALBUMIN UR-MCNC: 4.6 UG/ML
MONOCYTES # BLD AUTO: 0.8 X10E3/UL (ref 0.1–0.9)
MONOCYTES NFR BLD AUTO: 8 %
NEUTROPHILS # BLD AUTO: 6.6 X10E3/UL (ref 1.4–7)
NEUTROPHILS NFR BLD AUTO: 64 %
NITRITE UR QL STRIP: NEGATIVE
PH UR STRIP: 6 [PH] (ref 5–7.5)
PLATELET # BLD AUTO: 382 X10E3/UL (ref 150–450)
POTASSIUM SERPL-SCNC: 4 MMOL/L (ref 3.5–5.2)
PROT SERPL-MCNC: 7.1 G/DL (ref 6–8.5)
PROT UR QL STRIP: NEGATIVE
RBC # BLD AUTO: 4.84 X10E6/UL (ref 3.77–5.28)
RBC #/AREA URNS HPF: NORMAL /HPF (ref 0–2)
SODIUM SERPL-SCNC: 140 MMOL/L (ref 134–144)
SP GR UR STRIP: 1.01 (ref 1–1.03)
T3FREE SERPL-MCNC: 3 PG/ML (ref 2–4.4)
T4 FREE SERPL-MCNC: 1.1 NG/DL (ref 0.82–1.77)
TRIGL SERPL-MCNC: 135 MG/DL (ref 0–149)
TSH SERPL DL<=0.005 MIU/L-ACNC: 5.39 UIU/ML (ref 0.45–4.5)
UROBILINOGEN UR STRIP-MCNC: 0.2 MG/DL (ref 0.2–1)
VIT B12 SERPL-MCNC: 902 PG/ML (ref 232–1245)
VLDLC SERPL CALC-MCNC: 23 MG/DL (ref 5–40)
WBC # BLD AUTO: 10 X10E3/UL (ref 3.4–10.8)
WBC #/AREA URNS HPF: NORMAL /HPF (ref 0–5)

## 2025-02-28 ENCOUNTER — HOSPITAL ENCOUNTER (OUTPATIENT)
Dept: ULTRASOUND IMAGING | Facility: HOSPITAL | Age: 75
Discharge: HOME | End: 2025-02-28
Payer: MEDICARE

## 2025-02-28 PROCEDURE — 76536 US EXAM OF HEAD AND NECK: CPT

## 2025-03-10 ENCOUNTER — TELEPHONE (OUTPATIENT)
Dept: FAMILY MEDICINE CLINIC | Facility: CLINIC | Age: 75
End: 2025-03-10
Payer: MEDICARE

## 2025-03-10 NOTE — TELEPHONE ENCOUNTER
Caller: Arcelia Cole    Relationship: Self    Best call back number: [phone]     What form or medical record are you requesting:      Who is requesting this form or medical record from you:      How would you like to receive the form or medical records (pick-up, mail, fax):    If fax, what is the fax number:    If mail, what is the address:    If pick-up, provide patient with address and location details    Timeframe paperwork needed:      Additional notes: PATIENT CALLED AND WANTS TO KNOW IF DAVID JANE WILL COMPLETE A HANDICAP PLACARD FORM FOR HER FOR THE [employer].  ONCE IT IS COMPLETED PATIENT WILL  ON HER APPOINTMENT ON 3/24/25.

## 2025-03-15 RX ORDER — METFORMIN HYDROCHLORIDE 500 MG/1
TABLET, EXTENDED RELEASE ORAL
Qty: 180 TABLET | Refills: 0 | Status: SHIPPED | OUTPATIENT
Start: 2025-03-15

## 2025-03-15 RX ORDER — LEVOTHYROXINE SODIUM 50 UG/1
50 TABLET ORAL
Qty: 90 TABLET | Refills: 0 | Status: SHIPPED | OUTPATIENT
Start: 2025-03-15

## 2025-03-24 ENCOUNTER — OFFICE VISIT (OUTPATIENT)
Dept: FAMILY MEDICINE CLINIC | Facility: CLINIC | Age: 75
End: 2025-03-24
Payer: MEDICARE

## 2025-03-24 VITALS
SYSTOLIC BLOOD PRESSURE: 126 MMHG | OXYGEN SATURATION: 95 % | HEART RATE: 56 BPM | RESPIRATION RATE: 16 BRPM | HEIGHT: 63 IN | TEMPERATURE: 97.6 F | BODY MASS INDEX: 30.12 KG/M2 | DIASTOLIC BLOOD PRESSURE: 76 MMHG | WEIGHT: 170 LBS

## 2025-03-24 DIAGNOSIS — E04.1 THYROID NODULE: ICD-10-CM

## 2025-03-24 DIAGNOSIS — E11.9 TYPE 2 DIABETES MELLITUS WITHOUT COMPLICATION, WITHOUT LONG-TERM CURRENT USE OF INSULIN: Primary | ICD-10-CM

## 2025-03-24 DIAGNOSIS — L40.9 PSORIASIS: ICD-10-CM

## 2025-03-24 DIAGNOSIS — M17.11 PRIMARY OSTEOARTHRITIS OF RIGHT KNEE: ICD-10-CM

## 2025-03-24 DIAGNOSIS — R06.83 SNORING: ICD-10-CM

## 2025-03-24 DIAGNOSIS — F43.21 GRIEF: ICD-10-CM

## 2025-03-24 DIAGNOSIS — E78.2 HYPERLIPIDEMIA, MIXED: ICD-10-CM

## 2025-03-24 DIAGNOSIS — E55.9 VITAMIN D DEFICIENCY: ICD-10-CM

## 2025-03-24 DIAGNOSIS — I10 PRIMARY HYPERTENSION: ICD-10-CM

## 2025-03-24 DIAGNOSIS — E03.9 HYPOTHYROIDISM, ACQUIRED: ICD-10-CM

## 2025-03-24 PROCEDURE — G2211 COMPLEX E/M VISIT ADD ON: HCPCS | Performed by: PHYSICIAN ASSISTANT

## 2025-03-24 PROCEDURE — 3074F SYST BP LT 130 MM HG: CPT | Performed by: PHYSICIAN ASSISTANT

## 2025-03-24 PROCEDURE — 1160F RVW MEDS BY RX/DR IN RCRD: CPT | Performed by: PHYSICIAN ASSISTANT

## 2025-03-24 PROCEDURE — 3078F DIAST BP <80 MM HG: CPT | Performed by: PHYSICIAN ASSISTANT

## 2025-03-24 PROCEDURE — 1159F MED LIST DOCD IN RCRD: CPT | Performed by: PHYSICIAN ASSISTANT

## 2025-03-24 PROCEDURE — 3051F HG A1C>EQUAL 7.0%<8.0%: CPT | Performed by: PHYSICIAN ASSISTANT

## 2025-03-24 PROCEDURE — 99214 OFFICE O/P EST MOD 30 MIN: CPT | Performed by: PHYSICIAN ASSISTANT

## 2025-03-24 RX ORDER — AMOXICILLIN 250 MG
CAPSULE ORAL
COMMUNITY
Start: 2025-03-15

## 2025-03-24 RX ORDER — METFORMIN HYDROCHLORIDE 500 MG/1
1500 TABLET, EXTENDED RELEASE ORAL DAILY
Qty: 270 TABLET | Refills: 1 | Status: SHIPPED | OUTPATIENT
Start: 2025-03-24

## 2025-03-24 RX ORDER — LEVOTHYROXINE SODIUM 50 UG/1
50 TABLET ORAL
Qty: 90 TABLET | Refills: 3 | Status: SHIPPED | OUTPATIENT
Start: 2025-03-24

## 2025-03-24 RX ORDER — LISINOPRIL 10 MG/1
10 TABLET ORAL DAILY
Qty: 90 TABLET | Refills: 1 | Status: SHIPPED | OUTPATIENT
Start: 2025-03-24

## 2025-03-24 NOTE — PROGRESS NOTES
"Subjective   Arcelia Cole is a 74 y.o. female.     Hypertension  Pertinent negatives include no chest pain or neck pain.   Hyperlipidemia  Pertinent negatives include no chest pain or myalgias.   Diabetes  Associated symptoms include fatigue and weakness. Pertinent negatives for diabetes include no chest pain.   Fatigue  Symptoms include fatigue and weakness.    Pertinent negative symptoms include no abdominal pain, no chest pain, no chills, no congestion, no cough, no diaphoresis, no fever, no myalgias, no nausea, no neck pain, no numbness, no rash, no sore throat, no swollen glands, no dysuria and no vomiting.       Since the last visit, she has overall felt tired.  She has Primary Hypertension and well controlled on current medication, DMII not controlled on current regimen and will add/adjust medication, work on diet and exercise, get follow up labs, Hyperlipidemia with goals met with current Rx, and Vitamin D deficiency and labs are at goal >30 ng/mL.  she has been compliant with current medications have reviewed them.  The patient denies medication side effects.  Will refill medications. /86   Pulse 56   Temp 97.6 °F (36.4 °C) (Temporal)   Resp 16   Ht 160 cm (63\")   Wt 77.1 kg (170 lb)   LMP  (LMP Unknown)   SpO2 95%   BMI 30.11 kg/m² .      She is tired and staying tired.  She does snore.  --I am concerned could be obst sleep apnea    Urine microalbumin was normal at 4.6  Results for orders placed or performed in visit on 02/20/25   T4, Free    Collection Time: 02/20/25  3:32 PM    Specimen: Blood   Result Value Ref Range    Free T4 1.10 0.82 - 1.77 ng/dL   TSH    Collection Time: 02/20/25  3:32 PM    Specimen: Blood   Result Value Ref Range    TSH 5.390 (H) 0.450 - 4.500 uIU/mL   Comprehensive Metabolic Panel    Collection Time: 02/20/25  3:32 PM    Specimen: Blood   Result Value Ref Range    Glucose 102 (H) 70 - 99 mg/dL    BUN 10 8 - 27 mg/dL    Creatinine 0.77 0.57 - 1.00 mg/dL    EGFR " Result 81 >59 mL/min/1.73    BUN/Creatinine Ratio 13 12 - 28    Sodium 140 134 - 144 mmol/L    Potassium 4.0 3.5 - 5.2 mmol/L    Chloride 101 96 - 106 mmol/L    Total CO2 22 20 - 29 mmol/L    Calcium 10.3 8.7 - 10.3 mg/dL    Total Protein 7.1 6.0 - 8.5 g/dL    Albumin 4.8 3.8 - 4.8 g/dL    Globulin 2.3 1.5 - 4.5 g/dL    Total Bilirubin 0.3 0.0 - 1.2 mg/dL    Alkaline Phosphatase 107 44 - 121 IU/L    AST (SGOT) 20 0 - 40 IU/L    ALT (SGPT) 32 0 - 32 IU/L   Lipid Panel    Collection Time: 02/20/25  3:32 PM    Specimen: Blood   Result Value Ref Range    Total Cholesterol 169 100 - 199 mg/dL    Triglycerides 135 0 - 149 mg/dL    HDL Cholesterol 87 >39 mg/dL    VLDL Cholesterol Kwaku 23 5 - 40 mg/dL    LDL Chol Calc (NIH) 59 0 - 99 mg/dL   Hemoglobin A1c    Collection Time: 02/20/25  3:32 PM    Specimen: Blood   Result Value Ref Range    Hemoglobin A1C 7.8 (H) 4.8 - 5.6 %   Vitamin B12 & Folate    Collection Time: 02/20/25  3:32 PM    Specimen: Blood   Result Value Ref Range    Vitamin B-12 902 232 - 1,245 pg/mL    Folate >20.0 >3.0 ng/mL   Vitamin D,25-Hydroxy    Collection Time: 02/20/25  3:32 PM    Specimen: Blood   Result Value Ref Range    25 Hydroxy, Vitamin D 54.4 30.0 - 100.0 ng/mL   MicroAlbumin, Urine, Random - Urine, Clean Catch    Collection Time: 02/20/25  3:32 PM    Specimen: Urine, Clean Catch   Result Value Ref Range    Microalbumin, Urine 4.6 Not Estab. ug/mL   T3, Free    Collection Time: 02/20/25  3:32 PM    Specimen: Blood   Result Value Ref Range    T3, Free 3.0 2.0 - 4.4 pg/mL   Microscopic Examination -    Collection Time: 02/20/25  3:32 PM   Result Value Ref Range    WBC, UA None seen 0 - 5 /hpf    RBC, UA 0-2 0 - 2 /hpf    Epithelial Cells (non renal) 0-10 0 - 10 /hpf    Casts None seen None seen /lpf    Bacteria, UA None seen None seen/Few   Urinalysis With Microscopic - Urine, Clean Catch    Collection Time: 02/20/25  3:32 PM    Specimen: Urine, Clean Catch   Result Value Ref Range    Specific  Gravity, UA 1.014 1.005 - 1.030    pH, UA 6.0 5.0 - 7.5    Color, UA Yellow Yellow    Appearance, UA Clear Clear    Leukocytes, UA Trace (A) Negative    Protein Negative Negative/Trace    Glucose, UA Negative Negative    Ketones Negative Negative    Blood, UA Negative Negative    Bilirubin, UA Negative Negative    Urobilinogen, UA 0.2 0.2 - 1.0 mg/dL    Nitrite, UA Negative Negative    Microscopic Examination See below:    CBC & Differential    Collection Time: 02/20/25  3:32 PM    Specimen: Blood   Result Value Ref Range    WBC 10.0 3.4 - 10.8 x10E3/uL    RBC 4.84 3.77 - 5.28 x10E6/uL    Hemoglobin 14.3 11.1 - 15.9 g/dL    Hematocrit 43.0 34.0 - 46.6 %    MCV 89 79 - 97 fL    MCH 29.5 26.6 - 33.0 pg    MCHC 33.3 31.5 - 35.7 g/dL    RDW 13.2 11.7 - 15.4 %    Platelets 382 150 - 450 x10E3/uL    Neutrophil Rel % 64 Not Estab. %    Lymphocyte Rel % 24 Not Estab. %    Monocyte Rel % 8 Not Estab. %    Eosinophil Rel % 2 Not Estab. %    Basophil Rel % 1 Not Estab. %    Neutrophils Absolute 6.6 1.4 - 7.0 x10E3/uL    Lymphocytes Absolute 2.4 0.7 - 3.1 x10E3/uL    Monocytes Absolute 0.8 0.1 - 0.9 x10E3/uL    Eosinophils Absolute 0.2 0.0 - 0.4 x10E3/uL    Basophils Absolute 0.1 0.0 - 0.2 x10E3/uL    Immature Granulocyte Rel % 1 Not Estab. %    Immature Grans Absolute 0.1 0.0 - 0.1 x10E3/uL       Ana Pantoja PA-C  2/22/2025  8:24 AM EST       Hemoglobin A1c is still above 7 and can increase dose of your metformin for this.... Make sure you eat before you take it..  Increase to 3 tabs daily and if you are tolerating that without GI side effects after a week increase to the max of 4 daily and can be taken together due to extended release... Want this number down.  Urine is negative for protein.  Thyroid labs are in acceptable range.  Blood count normal.  Kidney and liver functions normal.  Other labs in acceptable range and we will go over at your appointment     Arcelia Cole female 74 y.o., /86   Pulse 56   Temp  "97.6 °F (36.4 °C) (Temporal)   Resp 16   Ht 160 cm (63\")   Wt 77.1 kg (170 lb)   LMP  (LMP Unknown)   SpO2 95%   BMI 30.11 kg/m²   who presents today for follow up of Depression and grief .  She reports Zoloft has worked well for her grief she is not crying anymore she is enjoying activities and feeling more leveled out. Onset of symptoms was approximately several months ago. She denies current suicidal and homicidal ideation. Risk factors are lifestyle of multiple roles and grief.  Previous treatment includes current Rx. She complains of the following medication side effects:none. The patient declines to go to counseling..      Saw me 2-20-25----plantar wart right and send her to Dr. Jones and he is currently working on it  She went to the [facility] ER 12/28/2024 diagnosed with COVID and given Paxlovid but also had labs   CT chest 12-28-24  Narrative & Impression   CT CHEST W CONTRAST DIAGNOSTIC-     INDICATIONS: Cough, tracheal deviation     TECHNIQUE: Radiation dose reduction techniques were utilized, including  automated exposure control and exposure modulation based on body size.  ENHANCED CHEST CT     COMPARISON: Correlated with x-ray from same date     FINDINGS:     The heart size is normal without pericardial effusion. No prominent  coronary arterial calcifications. A few small subcentimeter short axis  mediastinal lymph nodes are seen that are not significant by size  criteria.     Asymmetric enlargement of the right thyroid lobe contributes to previous  radiographically identified leftward tracheal deviation, and may reflect  presence of an underlying thyroid nodule, ultrasound correlation advised  as indicated. A hypodense nodule is seen more superiorly in the right  thyroid lobe, only partly included.        She did thyroid 2-28-25  CONCLUSION: TI-RADS category 3 nodule occupying the lower pole of the  right thyroid qualifies for fine-needle aspiration. The remaining  nodules can be followed with " "repeat thyroid ultrasound in 1 year.--I made referral to Dr Randle=---4-7-25.     Ultravate cream for her psoriasis works well as needed   Had bone density screening on 4/14/2023 noting osteopenia with overall FRAX score at 12% and hip score 2.6%     EKG 5/9/2021 no significant change from prior EKG, borderline EKG normal sinus rhythm LVH by voltage  She is doing PT exercise and pedal bike  Has cane for balance  DR Rodriguez is ortho and does give her injections in her knee   Arcelia Cole female 74 y.o., /86   Pulse 56   Temp 97.6 °F (36.4 °C) (Temporal)   Resp 16   Ht 160 cm (63\")   Wt 77.1 kg (170 lb)   LMP  (LMP Unknown)   SpO2 95%   BMI 30.11 kg/m²   who presents today for follow up of Depression and grief .  She reports medication is working well, patient desires to continue on Rx, and needs refill. Onset of symptoms was approximately several months ago. She denies current suicidal and homicidal ideation. Risk factors are lifestyle of multiple roles and grief.  Previous treatment includes current Rx. She complains of the following medication side effects:none. The patient declines to go to counseling..  Last bone density was 4/14/2023 noting osteopenia with FRAX score 12% hip score 2.6%  The following portions of the patient's history were reviewed and updated as appropriate: allergies, current medications, past family history, past medical history, past social history, past surgical history, and problem list.    Review of Systems   Constitutional:  Positive for fatigue. Negative for chills, diaphoresis and fever.   HENT:  Negative for congestion, sore throat and swollen glands.    Respiratory:  Negative for cough.    Cardiovascular:  Negative for chest pain.   Gastrointestinal:  Negative for abdominal pain, nausea and vomiting.   Genitourinary:  Negative for dysuria.   Musculoskeletal:  Negative for myalgias and neck pain.   Skin:  Negative for rash.   Neurological:  Positive for weakness. " Negative for numbness.       Objective   Physical Exam  Vitals and nursing note reviewed.   Constitutional:       General: She is not in acute distress.     Appearance: She is well-developed. She is obese. She is not ill-appearing or toxic-appearing.   HENT:      Head: Normocephalic.      Right Ear: External ear normal.      Left Ear: External ear normal.      Nose: Nose normal.      Mouth/Throat:      Pharynx: Oropharynx is clear.   Eyes:      General: No scleral icterus.     Conjunctiva/sclera: Conjunctivae normal.      Pupils: Pupils are equal, round, and reactive to light.   Neck:      Thyroid: No thyromegaly.   Cardiovascular:      Rate and Rhythm: Normal rate and regular rhythm.      Pulses: Normal pulses.      Heart sounds: Normal heart sounds. No murmur heard.  Pulmonary:      Effort: Pulmonary effort is normal. No respiratory distress.      Breath sounds: Normal breath sounds.   Musculoskeletal:         General: No deformity. Normal range of motion.      Cervical back: Normal range of motion and neck supple.      Right lower leg: No edema.      Left lower leg: No edema.   Skin:     General: Skin is warm and dry.      Findings: No rash.   Neurological:      General: No focal deficit present.      Mental Status: She is alert and oriented to person, place, and time. Mental status is at baseline.   Psychiatric:         Mood and Affect: Mood normal.         Behavior: Behavior normal.         Thought Content: Thought content normal.         Judgment: Judgment normal.           Assessment & Plan   Diagnoses and all orders for this visit:    1. Type 2 diabetes mellitus without complication, without long-term current use of insulin (Primary)    2. Primary hypertension    3. Hyperlipidemia, mixed    4. Hypothyroidism, acquired    5. Thyroid nodule    6. Vitamin D deficiency    7. Psoriasis    8. Grief    Other orders  -     sertraline (Zoloft) 50 MG tablet; one PO daily for stress  Dispense: 90 tablet; Refill: 3  -      metFORMIN ER (GLUCOPHAGE-XR) 500 MG 24 hr tablet; Take 3 tablets by mouth Daily. For type 2 diabetes new dose  Dispense: 270 tablet; Refill: 1      Plan, Arcelia Cole, was seen today.  she was seen for HTN and continue medication, DMII and adjusted/added medication, Hyperlipidemia and will continue current medication, Hypothyroidism well controlled, continue medication, and Vitamin D deficiency and supplemented.  Serum with continued fatigue and she does snore consult with sleep medicine ordered  Ultravate cream for her psoriasis works well as needed   Declines mammo screen  She is on 3 tablets now on the metformin and still loose stool we will wait to increase again to 4 tabs I do want follow-up on her A1c went to 7.8 increased on this lab from 2/20/2025  Continue Zoloft working well for grief and dose is appropriate   Concern with her right knee end-stage osteoarthritis we will have her see Dr. Alcala for Ortho consult  Consider Shingrix No. 2 and RSV vaccine  Seeing podiatry for the plantar wart  She is due for bone density in mammogram screening and wants to wait for now and can message me if desires to schedule

## 2025-04-15 ENCOUNTER — OFFICE VISIT (OUTPATIENT)
Dept: ORTHOPEDIC SURGERY | Facility: CLINIC | Age: 75
End: 2025-04-15
Payer: MEDICARE

## 2025-04-15 VITALS — WEIGHT: 170.8 LBS | HEIGHT: 63 IN | BODY MASS INDEX: 30.26 KG/M2 | TEMPERATURE: 98.2 F

## 2025-04-15 DIAGNOSIS — M17.11 PRIMARY OSTEOARTHRITIS OF RIGHT KNEE: Primary | ICD-10-CM

## 2025-04-15 DIAGNOSIS — M25.561 RIGHT KNEE PAIN, UNSPECIFIED CHRONICITY: ICD-10-CM

## 2025-04-15 NOTE — PROGRESS NOTES
Patient ID: Arcelia Cole     Chief Complaint:    Chief Complaint   Patient presents with    Right Knee - Establish Care, Pain, Initial Evaluation        HPI:    Arcelia Cole is a 74 y.o. who presents today for evaluation of right knee pain.  Patient states she has had issues for 5 years she was seeing another orthopedic specialist and getting treatment including over-the-counter medications, therapy and injections both steroid and gel.  She states Manny injections have not been helpful.  She reports that she has generalized knee pain and stiffness she uses a cane to help with ambulation.  She does report that her symptoms are limiting her normal daily activity and overall quality life.  She does report she had COVID beginning of this year still feels she is recovering a little bit for that and regaining energy it is improving some but she does get upcoming thyroid evaluation and sleep study.    Patient states she is overall healthy but does have history of diabetes she reports recent hemoglobin A1c of 7.8.  She instructed that this would have to be 7.5 or less prior to any surgical intervention.    Social History     Socioeconomic History    Marital status:    Tobacco Use    Smoking status: Never     Passive exposure: Never    Smokeless tobacco: Never   Vaping Use    Vaping status: Never Used   Substance and Sexual Activity    Alcohol use: Yes     Alcohol/week: 1.0 standard drink of alcohol     Types: 1 Glasses of wine per week     Comment: occasional    Drug use: Never     Past Medical History:   Diagnosis Date    Arthritis 2020    Hypertension 2023    Menopause     Psoriasis      Family History   Problem Relation Age of Onset    Diabetes Mother     Heart disease Mother     Diabetes Father     No Known Problems Sister     No Known Problems Brother     Alcohol abuse Daughter         44    No Known Problems Son     Cancer Maternal Grandmother         BLADDER     No Known Problems Paternal Grandmother   "   No Known Problems Maternal Grandfather     No Known Problems Paternal Grandfather     Breast cancer Neg Hx        ROS:    ROS:  Constitutional:  Denies fever, shaking or chills         All other pertinent positives and negatives as noted above in HPI.    Physical Exam:     Vital Signs:  Temp 98.2 °F (36.8 °C) (Temporal)   Ht 160 cm (63\")   Wt 77.5 kg (170 lb 12.8 oz)   LMP  (LMP Unknown)   BMI 30.26 kg/m²   Constitutional: Awake alert and oriented x3, well developed, well nourished, no acute distress, non-toxic appearance.      Musculoskeletal:    Exam of the right  knee:  Painful gait with a subtle limp  No muscle atrophy, erythema, ecchymosis, or gross deformity noted  mild knee effusion    Active range of motion 2-120  5/5 strength flexion and extension  The knee is stable to varus and valgus stress testing  Mild varus alignment of the limb  Lachman negative  Posterior drawer negative  Joselyn's negative  Patellofemoral grind +  Sensation grossly intact to light tough throughout the lower extremity  Skin is intact  Distal pulses are 2+  No signs or symptoms of DVT            Diagnostic Studies:     Imaging was personally and individually reviewed and discussed at length with the patient:    4V right knee(s) were taken in the office today, including AP, flexion PA, lateral, and sunrise views to evaluate the patient's complaint:  Weight bearing views show moderate degenerative changes in all three compartments with the lateral compartment being most affected.  There is early osteophyte formation throughout all three compartments.  There is no evidence of fracture or dislocation.  No periosteal reactions or medullary lesions are seen.  Patellar height and alignment are within normal limits.     Comparison films not available    AP pelvis was taken in the office today: Mild degenerative changes right hip joint, mild to moderate degenerative change left hip joint slight decrease in joint space right bone " sclerosis noted.            Assessment:     right  Knee Osteoarthritis            Plan:     Based on x-rays, history, and office evaluation, we have diagnosed Arcelia Cole with knee arthritis. At this time, we recommend starting with a conservative treatment program. This will consist of cortisone injection during significant flare-ups, NSAIDS for daily maintenance, physical therapy for strengthening and modalities as indicated, and bracing when appropriate. These measures will continue, until symptoms are no longer relieved, become more severe or function begins to significantly deteriorate. At that point joint replacement options will be discussed.    We did discuss surgical intervention including surgery in detail, perioperative and postoperative information as well as risk and benefits. I did discuss risk and benefits with the patient with risk including but not limited to bleeding, infection, damage to nearby nerves, vessels, tendons, ligaments, continued pain, worsening pain, fracture, dislocation, leg length discrepancy, blood clots, even death with anesthesia and possible need for future procedures surgeries.       Information packet given.    Given patient's continued workup for energy level I think that is pertinent to get established prior to any surgical intervention.  She will think over surgery and get her studies done as noted above but will follow-up check her status also will need to work on hemoglobin A1c.    hemoglobin A1c of 7.8.  She instructed that this would have to be 7.5 or less prior to any surgical intervention.    Follow up in 1 month unless symptoms return or a new issue occurs.  Patient will call the office to schedule an appointment.     All questions were answered, the patient understands and agrees with the plan.

## 2025-04-23 ENCOUNTER — OFFICE VISIT (OUTPATIENT)
Dept: SLEEP MEDICINE | Facility: HOSPITAL | Age: 75
End: 2025-04-23
Payer: MEDICARE

## 2025-04-23 ENCOUNTER — TELEPHONE (OUTPATIENT)
Dept: FAMILY MEDICINE CLINIC | Facility: CLINIC | Age: 75
End: 2025-04-23
Payer: MEDICARE

## 2025-04-23 VITALS — HEIGHT: 63 IN | OXYGEN SATURATION: 97 % | BODY MASS INDEX: 30.12 KG/M2 | HEART RATE: 88 BPM | WEIGHT: 170 LBS

## 2025-04-23 DIAGNOSIS — G47.10 HYPERSOMNIA: Primary | ICD-10-CM

## 2025-04-23 DIAGNOSIS — R06.83 SNORING: ICD-10-CM

## 2025-04-23 DIAGNOSIS — E66.9 OBESITY (BMI 30-39.9): ICD-10-CM

## 2025-04-23 DIAGNOSIS — G47.8 NON-RESTORATIVE SLEEP: ICD-10-CM

## 2025-04-23 PROCEDURE — 99204 OFFICE O/P NEW MOD 45 MIN: CPT | Performed by: FAMILY MEDICINE

## 2025-04-23 PROCEDURE — 1160F RVW MEDS BY RX/DR IN RCRD: CPT | Performed by: FAMILY MEDICINE

## 2025-04-23 PROCEDURE — 1159F MED LIST DOCD IN RCRD: CPT | Performed by: FAMILY MEDICINE

## 2025-04-23 PROCEDURE — G0463 HOSPITAL OUTPT CLINIC VISIT: HCPCS
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
## 2025-04-23 NOTE — TELEPHONE ENCOUNTER
"[facility] pharmacy called for clarification on patient's sertraline. The instructions are incomplete and they need to know what comes before \"4 days\". Pharmacist or Intern can be reached at [phone].  "

## 2025-06-03 ENCOUNTER — OFFICE VISIT (OUTPATIENT)
Dept: ORTHOPEDIC SURGERY | Facility: CLINIC | Age: 75
End: 2025-06-03
Payer: MEDICARE

## 2025-06-03 VITALS — BODY MASS INDEX: 30.55 KG/M2 | TEMPERATURE: 98.5 F | HEIGHT: 63 IN | WEIGHT: 172.4 LBS

## 2025-06-03 DIAGNOSIS — M17.11 PRIMARY OSTEOARTHRITIS OF RIGHT KNEE: Primary | ICD-10-CM

## 2025-06-03 RX ORDER — PREGABALIN 75 MG/1
150 CAPSULE ORAL ONCE
OUTPATIENT
Start: 2025-06-03 | End: 2025-06-03

## 2025-06-03 RX ORDER — MELOXICAM 7.5 MG/1
15 TABLET ORAL ONCE
OUTPATIENT
Start: 2025-06-03 | End: 2025-06-03

## 2025-06-03 RX ORDER — ACETAMINOPHEN 10 MG/ML
1000 INJECTION, SOLUTION INTRAVENOUS ONCE
OUTPATIENT
Start: 2025-06-03

## 2025-06-03 NOTE — PROGRESS NOTES
Patient: Arcelia Cole  YOB: 1950 74 y.o. female  Medical Record Number: [id number]    Chief Complaints:   Chief Complaint   Patient presents with    Right Knee - Follow-up, Pain       History of Present Illness:Arcelia Cole is a 74 y.o. female who presents for follow-up of right knee pain.  Patient has been seen previously diagnosed with knee arthritis.  She has acute on chronic right knee pain.  She has tried and failed most [facility]a treatments including medications, therapy and injections.  States generalized knee pain has been limiting normal to activities overall quality life.  Patient does have diabetes supposed to have tooth replacement and limited social help.    Allergies: No Known Allergies    Medications:   Current Outpatient Medications   Medication Sig Dispense Refill    Cobalamin Combinations (B-12) 100-5000 MCG sublingual tablet       halobetasol (ULTRAVATE) 0.05 % cream Apply  topically to the appropriate area as directed 2 (Two) Times a Day. PRN and never on face 50 g 11    levothyroxine (SYNTHROID, LEVOTHROID) 50 MCG tablet Take 1 tablet by mouth Every Morning Before Breakfast. For thyroid 90 tablet 3    lisinopril (PRINIVIL,ZESTRIL) 10 MG tablet Take 1 tablet by mouth Daily. For BP, 90 tablet 1    metFORMIN ER (GLUCOPHAGE-XR) 500 MG 24 hr tablet Take 3 tablets by mouth Daily. For type 2 diabetes new dose 270 tablet 1    Multiple Vitamins-Minerals (CENTRUM WOMEN PO) Take  by mouth.      rosuvastatin (Crestor) 5 MG tablet Take 1 tablet by mouth Daily. For cholesterol 90 tablet 3    sertraline (ZOLOFT) 50 MG tablet 1/2 tab PO daily for 4 DAYS THEN TAKE 1 TABLET BY MOUTH DAILY FOR STRESS 90 tablet 0     No current facility-administered medications for this visit.         The following portions of the patient's history were reviewed and updated as appropriate: allergies, current medications, past family history, past medical history, past social history, past surgical history and  "problem list.    Review of Systems:   A 14 point review of systems was performed. All systems negative except pertinent positives/negative listed in HPI above    Physical Exam:   Vitals:    06/03/25 1558   Temp: 98.5 °F (36.9 °C)   Weight: 78.2 kg (172 lb 6.4 oz)   Height: 160 cm (63\")   PainSc: 9    PainLoc: Knee       General: A and O x 3, ASA, NAD    SCLERA:    Normal    DENTITION:   Normal  Knee exam unchanged        Assessment/Plan:  Right knee osteoarthritis    Discussed findings with the patient she has tried and failed multiple [facility] of treatments as noted above.  I feel she is a candidate for total knee replacement surgery.  We discussed surgery in detail including risk and benefits.  I did discuss risk and benefits with the patient with risk including but not limited to bleeding, infection, damage to nearby nerves, vessels, tendons, ligaments, continued pain, worsening pain, fracture, dislocation, leg length discrepancy, blood clots, even death with anesthesia and possible need for future procedures surgeries.  Patient does have diabetes hemoglobin A1c will need to be monitored and corrected under 7.5 as diabetes can be an independent risk factors for some of the above as discussed.  Patient understood this and has chosen to proceed.      Plan for right total knee replacement    May require dental clearance we will check with her about tooth replacement.    Hemoglobin A1c needs to be under 7.5.    Patient will need to have her social situation for transportation planned prior to before surgery    Prehab for conditioning prior to surgery      23 observation.    All questions answered patient stands agrees        Disclaimer: Please note that areas of this note were completed with computer voice recognition software.  Quite often unanticipated grammatical, syntax, homophones, and other interpretive errors are inadvertently transcribed by the computer software. Please excuse any errors that have escaped final " proofreading.    Shahriar Alcala MD

## 2025-06-10 ENCOUNTER — TELEPHONE (OUTPATIENT)
Dept: ORTHOPEDIC SURGERY | Facility: CLINIC | Age: 75
End: 2025-06-10
Payer: MEDICARE

## 2025-06-10 NOTE — TELEPHONE ENCOUNTER
Call returned to patient. She is going to try a gel injection at an office that specializes in arthritis. She wanted to make sure if the injection did not work that surgery would still be an option. I explained to her that it would have to be 3 months after an injection before she could have surgery, but that her case request would remain valid for about 6 months. She has my direct line and will call me if anything changes.

## 2025-06-12 RX ORDER — LEVOTHYROXINE SODIUM 50 UG/1
50 TABLET ORAL
Qty: 90 TABLET | Refills: 0 | Status: SHIPPED | OUTPATIENT
Start: 2025-06-12

## 2025-06-23 ENCOUNTER — HOSPITAL ENCOUNTER (OUTPATIENT)
Dept: SLEEP MEDICINE | Facility: HOSPITAL | Age: 75
Discharge: HOME | End: 2025-06-23
Admitting: FAMILY MEDICINE
Payer: MEDICARE

## 2025-06-23 DIAGNOSIS — G47.8 NON-RESTORATIVE SLEEP: ICD-10-CM

## 2025-06-23 DIAGNOSIS — G47.10 HYPERSOMNIA: ICD-10-CM

## 2025-06-23 DIAGNOSIS — E66.9 OBESITY (BMI 30-39.9): ICD-10-CM

## 2025-06-23 DIAGNOSIS — R06.83 SNORING: ICD-10-CM

## 2025-06-23 PROCEDURE — 95810 POLYSOM 6/> YRS 4/> PARAM: CPT

## 2025-06-27 DIAGNOSIS — G47.8 NON-RESTORATIVE SLEEP: ICD-10-CM

## 2025-06-27 DIAGNOSIS — G47.33 OBSTRUCTIVE SLEEP APNEA: Primary | ICD-10-CM

## 2025-06-27 DIAGNOSIS — R06.83 SNORING: ICD-10-CM

## 2025-06-27 DIAGNOSIS — E66.9 OBESITY (BMI 30-39.9): ICD-10-CM

## 2025-06-27 DIAGNOSIS — G47.10 HYPERSOMNIA: ICD-10-CM

## 2025-06-27 DIAGNOSIS — G47.61 PERIODIC LIMB MOVEMENT DISORDER: ICD-10-CM

## 2025-07-09 ENCOUNTER — TELEPHONE (OUTPATIENT)
Dept: SLEEP MEDICINE | Facility: HOSPITAL | Age: 75
End: 2025-07-09
Payer: MEDICARE